# Patient Record
Sex: FEMALE | Race: WHITE | NOT HISPANIC OR LATINO | URBAN - METROPOLITAN AREA
[De-identification: names, ages, dates, MRNs, and addresses within clinical notes are randomized per-mention and may not be internally consistent; named-entity substitution may affect disease eponyms.]

---

## 2023-06-06 ENCOUNTER — OFFICE VISIT (OUTPATIENT)
Dept: PRIMARY CARE | Facility: CLINIC | Age: 23
End: 2023-06-06
Payer: COMMERCIAL

## 2023-06-06 VITALS
TEMPERATURE: 97.1 F | SYSTOLIC BLOOD PRESSURE: 102 MMHG | HEART RATE: 84 BPM | RESPIRATION RATE: 18 BRPM | HEIGHT: 62 IN | OXYGEN SATURATION: 97 % | WEIGHT: 147 LBS | DIASTOLIC BLOOD PRESSURE: 70 MMHG | BODY MASS INDEX: 27.05 KG/M2

## 2023-06-06 DIAGNOSIS — F32.A ANXIETY AND DEPRESSION: ICD-10-CM

## 2023-06-06 DIAGNOSIS — Z00.00 HEALTH MAINTENANCE EXAMINATION: Primary | ICD-10-CM

## 2023-06-06 DIAGNOSIS — F41.9 ANXIETY AND DEPRESSION: ICD-10-CM

## 2023-06-06 DIAGNOSIS — R53.83 OTHER FATIGUE: ICD-10-CM

## 2023-06-06 DIAGNOSIS — M62.89 PELVIC FLOOR DYSFUNCTION: ICD-10-CM

## 2023-06-06 DIAGNOSIS — R10.9 ABDOMINAL PAIN, UNSPECIFIED ABDOMINAL LOCATION: ICD-10-CM

## 2023-06-06 DIAGNOSIS — M79.7 FIBROMYALGIA: ICD-10-CM

## 2023-06-06 PROBLEM — F41.1 GAD (GENERALIZED ANXIETY DISORDER): Status: ACTIVE | Noted: 2023-06-06

## 2023-06-06 PROCEDURE — 99395 PREV VISIT EST AGE 18-39: CPT | Performed by: FAMILY MEDICINE

## 2023-06-06 PROCEDURE — 1036F TOBACCO NON-USER: CPT | Performed by: FAMILY MEDICINE

## 2023-06-06 PROCEDURE — 99214 OFFICE O/P EST MOD 30 MIN: CPT | Performed by: FAMILY MEDICINE

## 2023-06-06 RX ORDER — PROPRANOLOL HYDROCHLORIDE 10 MG/1
TABLET ORAL
COMMUNITY

## 2023-06-06 RX ORDER — BUPROPION HYDROCHLORIDE 300 MG/1
TABLET ORAL
COMMUNITY
Start: 2023-05-03

## 2023-06-06 RX ORDER — MEDROXYPROGESTERONE ACETATE 150 MG/ML
INJECTION, SUSPENSION INTRAMUSCULAR
COMMUNITY
End: 2023-08-02 | Stop reason: ALTCHOICE

## 2023-06-06 RX ORDER — PERPHENAZINE 4 MG/1
8 TABLET ORAL 2 TIMES DAILY
COMMUNITY
Start: 2022-02-17

## 2023-06-06 NOTE — PROGRESS NOTES
Reason for Visit: Annual Physical Exam    HPI:  Pt is status post top surgery at [facility], doing well.    She sees GYN at [facility], Dr. Ge, interested in going off of Depo-Provera because of weight gain, hesitant because it has been most helpful with her cycles and migraines caused by cycles.    Diagnosed with pelvic floor dysfunction by GYN.  Started PT but it is very far and hard to get to, wonders if any other options or if anything closer.    Reports significant fatigue.  Going on for about 4-5 years.  Did have hx of severe depression about 2 years ago which has improved but that was one of the main symptoms therefore struggles with these symptoms.  Sleeps all the time and never feels truly rested.  Feels like could always take a nap.    Reports epigastric abdominal pain present most of the time.  Was on PPI and it did help.  Wonders if possible to get gluten testing because eats a lot of carbs.        Active Problem List  Patient Active Problem List   Diagnosis    Anxiety and depression    STEPHANIE (generalized anxiety disorder)    Fibromyalgia    Pelvic floor dysfunction    Health maintenance examination    Other fatigue    Abdominal pain       Comprehensive Medical/Surgical/Social/Family History  Past Medical History:   Diagnosis Date    Flat foot (pes planus) (acquired), right foot     Flat feet    Presence of spectacles and contact lenses     Wears glasses     Past Surgical History:   Procedure Laterality Date    OTHER SURGICAL HISTORY  03/28/2021    Ankle surgery     Social History     Tobacco Use    Smoking status: Never    Smokeless tobacco: Never   Substance Use Topics    Alcohol use: Yes     Comment: rarely    Drug use: Yes     Types: Marijuana     No family history on file.      Allergies and Medications  Penicillins and Adhesive  Current Outpatient Medications on File Prior to Visit   Medication Sig Dispense Refill    buPROPion XL (Wellbutrin XL) 300 mg 24 hr tablet TAKE ONE TABLET BY MOUTH EVERY DAY  "IN THE MORNING for depression      perphenazine 4 mg tablet TAKE ONE TABLET BY MOUTH EVERY DAY FOR MOOD STABILITY      medroxyPROGESTERone (Depo-Provera) 150 mg/mL syringe injection syringe Inject into the shoulder, thigh, or buttocks.      propranolol (Inderal) 10 mg tablet TAKE ONE TABLET BY MOUTH TWO TIMES A DAY as needed for anxiety       No current facility-administered medications on file prior to visit.         ROS otherwise negative aside from what was mentioned above in HPI.    Vitals  /70 (BP Location: Right arm, Patient Position: Sitting)   Pulse 84   Temp 36.2 °C (97.1 °F) (Temporal)   Resp 18   Ht 1.575 m (5' 2.01\")   Wt 66.7 kg (147 lb)   SpO2 97%   BMI 26.88 kg/m²   Body mass index is 26.88 kg/m².  Physical Exam  Gen: Alert, NAD  HEENT:  PERRL, EOMI, conjunctiva and sclera normal in appearance. External auditory canals/TMs normal; Oral cavity and posterior pharynx without lesions/exudate  Neck:  Supple with FROM; No masses/nodes palpable; Thyroid nontender and without nodules; No YOJANA  Respiratory:  Lungs CTAB  Cardiovascular:  Heart RRR. No M/R/G. Peripheral pulses equal bilaterally  Abdomen:  Soft, nontender, BS present throughout; No R/G/R; No HSM or masses palpated  Extremities:  FROM all extremities; Muscle strength grossly normal with good tone  Neuro:  CN II-XII intact; Reflexes 2+/2+; Gross motor and sensory intact  Skin:  No suspicious lesions present    Assessment and Plan:  Problem List Items Addressed This Visit          Nervous    Abdominal pain    Current Assessment & Plan     GERD vs PUD vs IBS  Due to epigastric discomfort suspicion for high acidity  Recommend PRN use of PPI or H2 blocker  Will check CMP and celiac panel  Consideration for elimination diet such as FODMAPS         Relevant Orders    Celiac Panel       Musculoskeletal    Fibromyalgia    Current Assessment & Plan     Improved, stable symptoms          Pelvic floor dysfunction    Current Assessment & Plan     " Discussed importance of PT for these symptoms  Can refer to  pelvic floor to see if this would be more convenient         Relevant Orders    Referral to Physical Therapy       Other    Anxiety and depression    Current Assessment & Plan     Stable currently         Health maintenance examination - Primary    Current Assessment & Plan     Recommended screening guidelines addressed and orders placed as indicated by age and chronic conditions  Screening labs ordered, will call with results  PAP at 22 yo, sees GYN  Continue to work on healthy lifestyle including well balanced diet, regular activity, limit alcohol, no tobacco products and safe sexual practices  Follow up annually           Relevant Orders    Basic metabolic panel    CBC    Lipid Panel    Other fatigue    Current Assessment & Plan     Discussed multifactorial nature of this condition  Will obtain labs initially to evaluation  Pursue workup further if labs are normal         Relevant Orders    CBC    Iron and TIBC    TSH with reflex to Free T4 if abnormal    Vitamin B12    Vitamin D, Total         Marilu Carvalho MD

## 2023-06-07 ENCOUNTER — LAB (OUTPATIENT)
Dept: LAB | Facility: LAB | Age: 23
End: 2023-06-07
Payer: COMMERCIAL

## 2023-06-07 DIAGNOSIS — R10.9 ABDOMINAL PAIN, UNSPECIFIED ABDOMINAL LOCATION: ICD-10-CM

## 2023-06-07 DIAGNOSIS — R53.83 OTHER FATIGUE: ICD-10-CM

## 2023-06-07 DIAGNOSIS — Z00.00 HEALTH MAINTENANCE EXAMINATION: ICD-10-CM

## 2023-06-07 LAB
ANION GAP IN SER/PLAS: 14 MMOL/L (ref 10–20)
CALCIDIOL (25 OH VITAMIN D3) (NG/ML) IN SER/PLAS: 19 NG/ML
CALCIUM (MG/DL) IN SER/PLAS: 9.7 MG/DL (ref 8.6–10.6)
CARBON DIOXIDE, TOTAL (MMOL/L) IN SER/PLAS: 26 MMOL/L (ref 21–32)
CHLORIDE (MMOL/L) IN SER/PLAS: 101 MMOL/L (ref 98–107)
CHOLESTEROL (MG/DL) IN SER/PLAS: 135 MG/DL (ref 0–199)
CHOLESTEROL IN HDL (MG/DL) IN SER/PLAS: 29.9 MG/DL
CHOLESTEROL/HDL RATIO: 4.5
COBALAMIN (VITAMIN B12) (PG/ML) IN SER/PLAS: 1384 PG/ML (ref 211–911)
CREATININE (MG/DL) IN SER/PLAS: 0.99 MG/DL (ref 0.5–1.05)
DEAMIDATED GLIADIN PEPTIDE IGA: <1 U/ML (ref 0–14)
DEAMIDATED GLIADIN PEPTIDE IGG: <1 U/ML (ref 0–14)
ERYTHROCYTE DISTRIBUTION WIDTH (RATIO) BY AUTOMATED COUNT: 13.1 % (ref 11.5–14.5)
ERYTHROCYTE MEAN CORPUSCULAR HEMOGLOBIN CONCENTRATION (G/DL) BY AUTOMATED: 31.8 G/DL (ref 32–36)
ERYTHROCYTE MEAN CORPUSCULAR VOLUME (FL) BY AUTOMATED COUNT: 87 FL (ref 80–100)
ERYTHROCYTES (10*6/UL) IN BLOOD BY AUTOMATED COUNT: 4.64 X10E12/L (ref 4–5.2)
GFR FEMALE: 83 ML/MIN/1.73M2
GLUCOSE (MG/DL) IN SER/PLAS: 135 MG/DL (ref 74–99)
HEMATOCRIT (%) IN BLOOD BY AUTOMATED COUNT: 40.3 % (ref 36–46)
HEMOGLOBIN (G/DL) IN BLOOD: 12.8 G/DL (ref 12–16)
IRON (UG/DL) IN SER/PLAS: 37 UG/DL (ref 35–150)
IRON BINDING CAPACITY (UG/DL) IN SER/PLAS: 388 UG/DL (ref 240–445)
IRON SATURATION (%) IN SER/PLAS: 10 % (ref 25–45)
LDL: 77 MG/DL (ref 0–109)
LEUKOCYTES (10*3/UL) IN BLOOD BY AUTOMATED COUNT: 6.8 X10E9/L (ref 4.4–11.3)
NON HDL CHOLESTEROL: 105 MG/DL (ref 0–119)
NRBC (PER 100 WBCS) BY AUTOMATED COUNT: 0 /100 WBC (ref 0–0)
PLATELETS (10*3/UL) IN BLOOD AUTOMATED COUNT: 368 X10E9/L (ref 150–450)
POTASSIUM (MMOL/L) IN SER/PLAS: 4 MMOL/L (ref 3.5–5.3)
SODIUM (MMOL/L) IN SER/PLAS: 137 MMOL/L (ref 136–145)
THYROTROPIN (MIU/L) IN SER/PLAS BY DETECTION LIMIT <= 0.05 MIU/L: 1.39 MIU/L (ref 0.44–3.98)
TISSUE TRANSGLUTAMINASE IGG: <1 U/ML (ref 0–14)
TISSUE TRANSGLUTAMINASE, IGA: <1 U/ML (ref 0–14)
TRIGLYCERIDE (MG/DL) IN SER/PLAS: 139 MG/DL (ref 0–149)
UREA NITROGEN (MG/DL) IN SER/PLAS: 11 MG/DL (ref 6–23)
VLDL: 28 MG/DL (ref 0–40)

## 2023-06-07 PROCEDURE — 85027 COMPLETE CBC AUTOMATED: CPT

## 2023-06-07 PROCEDURE — 80061 LIPID PANEL: CPT

## 2023-06-07 PROCEDURE — 80048 BASIC METABOLIC PNL TOTAL CA: CPT

## 2023-06-07 PROCEDURE — 82607 VITAMIN B-12: CPT

## 2023-06-07 PROCEDURE — 36415 COLL VENOUS BLD VENIPUNCTURE: CPT

## 2023-06-07 PROCEDURE — 83550 IRON BINDING TEST: CPT

## 2023-06-07 PROCEDURE — 83516 IMMUNOASSAY NONANTIBODY: CPT

## 2023-06-07 PROCEDURE — 84443 ASSAY THYROID STIM HORMONE: CPT

## 2023-06-07 PROCEDURE — 82306 VITAMIN D 25 HYDROXY: CPT

## 2023-06-07 PROCEDURE — 83540 ASSAY OF IRON: CPT

## 2023-06-07 NOTE — ASSESSMENT & PLAN NOTE
Recommended screening guidelines addressed and orders placed as indicated by age and chronic conditions  Screening labs ordered, will call with results  PAP at 24 yo, sees GYN  Continue to work on healthy lifestyle including well balanced diet, regular activity, limit alcohol, no tobacco products and safe sexual practices  Follow up annually

## 2023-06-07 NOTE — ASSESSMENT & PLAN NOTE
Discussed importance of PT for these symptoms  Can refer to  pelvic floor to see if this would be more convenient

## 2023-06-07 NOTE — ASSESSMENT & PLAN NOTE
Discussed multifactorial nature of this condition  Will obtain labs initially to evaluation  Pursue workup further if labs are normal   Solaraze Pregnancy And Lactation Text: This medication is Pregnancy Category B and is considered safe. There is some data to suggest avoiding during the third trimester. It is unknown if this medication is excreted in breast milk.

## 2023-06-08 DIAGNOSIS — E55.9 VITAMIN D DEFICIENCY: Primary | ICD-10-CM

## 2023-06-08 RX ORDER — ERGOCALCIFEROL 1.25 MG/1
50000 CAPSULE ORAL
Qty: 8 CAPSULE | Refills: 0 | Status: SHIPPED | OUTPATIENT
Start: 2023-06-08 | End: 2023-08-03

## 2023-07-28 ENCOUNTER — TELEPHONE (OUTPATIENT)
Dept: PRIMARY CARE | Facility: CLINIC | Age: 23
End: 2023-07-28
Payer: COMMERCIAL

## 2023-07-28 ENCOUNTER — PATIENT MESSAGE (OUTPATIENT)
Dept: PRIMARY CARE | Facility: CLINIC | Age: 23
End: 2023-07-28
Payer: COMMERCIAL

## 2023-07-28 DIAGNOSIS — M79.7 FIBROMYALGIA: Primary | ICD-10-CM

## 2023-07-28 RX ORDER — CYCLOBENZAPRINE HCL 5 MG
5 TABLET ORAL NIGHTLY PRN
Qty: 15 TABLET | Refills: 0 | Status: SHIPPED | OUTPATIENT
Start: 2023-07-28 | End: 2023-08-02 | Stop reason: SINTOL

## 2023-08-02 ENCOUNTER — OFFICE VISIT (OUTPATIENT)
Dept: PRIMARY CARE | Facility: CLINIC | Age: 23
End: 2023-08-02
Payer: COMMERCIAL

## 2023-08-02 VITALS
WEIGHT: 146.6 LBS | BODY MASS INDEX: 26.98 KG/M2 | HEART RATE: 76 BPM | SYSTOLIC BLOOD PRESSURE: 102 MMHG | TEMPERATURE: 98.4 F | OXYGEN SATURATION: 98 % | DIASTOLIC BLOOD PRESSURE: 68 MMHG | HEIGHT: 62 IN

## 2023-08-02 DIAGNOSIS — M79.7 FIBROMYALGIA: Primary | ICD-10-CM

## 2023-08-02 PROCEDURE — 99213 OFFICE O/P EST LOW 20 MIN: CPT | Performed by: FAMILY MEDICINE

## 2023-08-02 PROCEDURE — 1036F TOBACCO NON-USER: CPT | Performed by: FAMILY MEDICINE

## 2023-08-02 RX ORDER — LANOLIN ALCOHOL/MO/W.PET/CERES
100 CREAM (GRAM) TOPICAL DAILY
COMMUNITY

## 2023-08-02 NOTE — ASSESSMENT & PLAN NOTE
Extensive discussion regarding fibromyalgia and available treatments  Discussed expectations, limitations of currently available treatments  Pt is establishing with new psychologist, recommend considering CBT if available  Remain active as long as not triggering symptoms   Try to limit sleep to regular nighttime sleep   Stay well hydrated  Discussed medical marijuana due to pt's previous success with cannabis, information provided  Also, referral to rheumatology provided to discuss second opinion, treatment options

## 2023-08-02 NOTE — PROGRESS NOTES
"Subjective   Patient ID: Glenn Cox is a 22 y.o. adult who presents for Fibromyalgia (HAVING A FLARE, PAIN ALL OVER HER BODY,  STARTED ON THURS).    HPI   Has history of fibromyalgia diagnosed by rheumatology in 2022.  Reports recurrent flares.  Can last several weeks or several days. Current episode started about one week ago, some improvement but limits her ability to function.  Experiencing significant fatigue, joint pains. Trying to stay active but it makes her even more fatigued.    Very hesitant about medication because feels like she takes too much at this time.  Would like to avoid.    Review of Systems  Negative unless noted in HPI    Objective   /68 (BP Location: Left arm, Patient Position: Sitting)   Pulse 76   Temp 36.9 °C (98.4 °F) (Oral) Comment: USUALLY 97  Ht 1.575 m (5' 2.01\")   Wt 66.5 kg (146 lb 9.6 oz)   SpO2 98%   BMI 26.81 kg/m²     Physical Exam  Constitutional:       Appearance: Normal appearance.   Pulmonary:      Effort: Pulmonary effort is normal.   Neurological:      General: No focal deficit present.      Mental Status: Glenn Cox is alert.   Psychiatric:         Mood and Affect: Mood normal.         Assessment/Plan   Problem List Items Addressed This Visit       Fibromyalgia - Primary     Extensive discussion regarding fibromyalgia and available treatments  Discussed expectations, limitations of currently available treatments  Pt is establishing with new psychologist, recommend considering CBT if available  Remain active as long as not triggering symptoms   Try to limit sleep to regular nighttime sleep   Stay well hydrated  Discussed medical marijuana due to pt's previous success with cannabis, information provided  Also, referral to rheumatology provided to discuss second opinion, treatment options         Relevant Orders    Referral to Rheumatology          "

## 2023-08-16 ENCOUNTER — OFFICE VISIT (OUTPATIENT)
Dept: PRIMARY CARE | Facility: CLINIC | Age: 23
End: 2023-08-16
Payer: COMMERCIAL

## 2023-08-16 VITALS
HEART RATE: 92 BPM | BODY MASS INDEX: 27.23 KG/M2 | DIASTOLIC BLOOD PRESSURE: 74 MMHG | HEIGHT: 62 IN | TEMPERATURE: 98.6 F | WEIGHT: 148 LBS | SYSTOLIC BLOOD PRESSURE: 122 MMHG | OXYGEN SATURATION: 98 %

## 2023-08-16 DIAGNOSIS — Z20.818 STREP THROAT EXPOSURE: ICD-10-CM

## 2023-08-16 DIAGNOSIS — J02.9 PHARYNGITIS, UNSPECIFIED ETIOLOGY: Primary | ICD-10-CM

## 2023-08-16 DIAGNOSIS — N39.0 URINARY TRACT INFECTION WITHOUT HEMATURIA, SITE UNSPECIFIED: ICD-10-CM

## 2023-08-16 LAB
POC APPEARANCE, URINE: CLEAR
POC BILIRUBIN, URINE: NEGATIVE
POC BLOOD, URINE: ABNORMAL
POC COLOR, URINE: YELLOW
POC GLUCOSE, URINE: NEGATIVE MG/DL
POC KETONES, URINE: NEGATIVE MG/DL
POC LEUKOCYTES, URINE: ABNORMAL
POC NITRITE,URINE: NEGATIVE
POC PH, URINE: 7 PH
POC PROTEIN, URINE: NEGATIVE MG/DL
POC RAPID STREP: NEGATIVE
POC SPECIFIC GRAVITY, URINE: <=1.005
POC UROBILINOGEN, URINE: 0.2 EU/DL

## 2023-08-16 PROCEDURE — 1036F TOBACCO NON-USER: CPT | Performed by: NURSE PRACTITIONER

## 2023-08-16 PROCEDURE — 87086 URINE CULTURE/COLONY COUNT: CPT

## 2023-08-16 PROCEDURE — 99213 OFFICE O/P EST LOW 20 MIN: CPT | Performed by: NURSE PRACTITIONER

## 2023-08-16 PROCEDURE — 87081 CULTURE SCREEN ONLY: CPT

## 2023-08-16 PROCEDURE — 87880 STREP A ASSAY W/OPTIC: CPT | Performed by: NURSE PRACTITIONER

## 2023-08-16 PROCEDURE — 81003 URINALYSIS AUTO W/O SCOPE: CPT | Performed by: NURSE PRACTITIONER

## 2023-08-16 RX ORDER — AZITHROMYCIN 250 MG/1
TABLET, FILM COATED ORAL
Qty: 6 TABLET | Refills: 0 | Status: SHIPPED | OUTPATIENT
Start: 2023-08-16 | End: 2023-08-21

## 2023-08-16 RX ORDER — ONDANSETRON 4 MG/1
4 TABLET, FILM COATED ORAL EVERY 8 HOURS PRN
Qty: 20 TABLET | Refills: 0 | Status: SHIPPED | OUTPATIENT
Start: 2023-08-16 | End: 2023-08-23

## 2023-08-16 ASSESSMENT — PAIN SCALES - GENERAL: PAINLEVEL: 4

## 2023-08-16 ASSESSMENT — ENCOUNTER SYMPTOMS: SORE THROAT: 1

## 2023-08-16 ASSESSMENT — PATIENT HEALTH QUESTIONNAIRE - PHQ9
2. FEELING DOWN, DEPRESSED OR HOPELESS: NOT AT ALL
SUM OF ALL RESPONSES TO PHQ9 QUESTIONS 1 AND 2: 0
1. LITTLE INTEREST OR PLEASURE IN DOING THINGS: NOT AT ALL

## 2023-08-16 NOTE — PATIENT INSTRUCTIONS
Rapid strep negative in office. Wait for results of strep testing.  Treat today for tonsillitis.  Zofran for nausea as needed while on antibiotic.  Follow up if symptoms are not improving with treatment.

## 2023-08-16 NOTE — PROGRESS NOTES
"Subjective   Patient ID: Glenn Cox is a 22 y.o. adult who presents for Sore Throat and UTI.    Presents for 3-4 days of fatigue and sore throat.   Denies any congestion, fever. +dysphagia  Advil helpls a little bit.   Neck and ear also painful on the right side.  No known exposures.  Also with some new dysuria x 2 days.      Sore Throat     UTI          Review of Systems   HENT:  Positive for sore throat.        Objective   /74 (BP Location: Left arm, Patient Position: Sitting, BP Cuff Size: Small adult)   Pulse 92   Temp 37 °C (98.6 °F) (Oral)   Ht 1.575 m (5' 2\")   Wt 67.1 kg (148 lb)   LMP  (LMP Unknown)   SpO2 98%   BMI 27.07 kg/m²     Physical Exam  Vitals and nursing note reviewed.   Constitutional:       General: Glenn Cox is not in acute distress.     Appearance: Normal appearance.   HENT:      Right Ear: Tympanic membrane, ear canal and external ear normal.      Left Ear: Tympanic membrane, ear canal and external ear normal.      Nose: Nose normal.      Mouth/Throat:      Mouth: Mucous membranes are moist.      Tongue: No lesions.      Pharynx: Pharyngeal swelling (right) and posterior oropharyngeal erythema present. No oropharyngeal exudate.      Tonsils: 3+ on the right. 1+ on the left.   Cardiovascular:      Rate and Rhythm: Normal rate and regular rhythm.      Pulses: Normal pulses.      Heart sounds: Normal heart sounds. No murmur heard.  Lymphadenopathy:      Cervical: Cervical adenopathy present.      Right cervical: Superficial cervical adenopathy present.      Left cervical: No superficial cervical adenopathy.         Assessment/Plan   Diagnoses and all orders for this visit:  Pharyngitis, unspecified etiology  -     azithromycin (Zithromax) 250 mg tablet; Take 2 tablets (500 mg) by mouth once daily for 1 day, THEN 1 tablet (250 mg) once daily for 4 days.  -     ondansetron (Zofran) 4 mg tablet; Take 1 tablet (4 mg) by mouth every 8 hours if needed for nausea or " vomiting for up to 7 days.  Strep throat exposure  -     POCT Rapid Strep A manually resulted  -     Group A Streptococcus, Culture  Urinary tract infection without hematuria, site unspecified  -     POCT UA Automated manually resulted  -     Urine Culture

## 2023-08-17 LAB — URINE CULTURE: ABNORMAL

## 2023-08-18 LAB — GROUP A STREP SCREEN, CULTURE: NORMAL

## 2023-09-20 ENCOUNTER — OFFICE VISIT (OUTPATIENT)
Dept: PRIMARY CARE | Facility: CLINIC | Age: 23
End: 2023-09-20
Payer: COMMERCIAL

## 2023-09-20 VITALS
HEIGHT: 62 IN | DIASTOLIC BLOOD PRESSURE: 76 MMHG | SYSTOLIC BLOOD PRESSURE: 110 MMHG | HEART RATE: 85 BPM | BODY MASS INDEX: 27.12 KG/M2 | WEIGHT: 147.4 LBS | OXYGEN SATURATION: 98 % | TEMPERATURE: 97.6 F

## 2023-09-20 DIAGNOSIS — Z30.019 ENCOUNTER FOR FEMALE BIRTH CONTROL: ICD-10-CM

## 2023-09-20 DIAGNOSIS — N91.2 AMENORRHEA: ICD-10-CM

## 2023-09-20 DIAGNOSIS — L42 PITYRIASIS ROSEA: Primary | ICD-10-CM

## 2023-09-20 PROBLEM — R10.9 ABDOMINAL PAIN: Status: RESOLVED | Noted: 2023-06-06 | Resolved: 2023-09-20

## 2023-09-20 PROBLEM — Z00.00 HEALTH MAINTENANCE EXAMINATION: Status: RESOLVED | Noted: 2023-06-06 | Resolved: 2023-09-20

## 2023-09-20 LAB — PREGNANCY TEST URINE, POC: NEGATIVE

## 2023-09-20 PROCEDURE — 99214 OFFICE O/P EST MOD 30 MIN: CPT | Performed by: FAMILY MEDICINE

## 2023-09-20 PROCEDURE — 81025 URINE PREGNANCY TEST: CPT | Performed by: FAMILY MEDICINE

## 2023-09-20 PROCEDURE — 1036F TOBACCO NON-USER: CPT | Performed by: FAMILY MEDICINE

## 2023-09-20 RX ORDER — ETONOGESTREL AND ETHINYL ESTRADIOL VAGINAL RING .015; .12 MG/D; MG/D
1 RING VAGINAL
Qty: 3 EACH | Refills: 3 | Status: SHIPPED | OUTPATIENT
Start: 2023-09-20 | End: 2024-09-19

## 2023-09-20 ASSESSMENT — PAIN SCALES - GENERAL: PAINLEVEL: 4

## 2023-09-20 ASSESSMENT — PATIENT HEALTH QUESTIONNAIRE - PHQ9
2. FEELING DOWN, DEPRESSED OR HOPELESS: NOT AT ALL
1. LITTLE INTEREST OR PLEASURE IN DOING THINGS: NOT AT ALL
SUM OF ALL RESPONSES TO PHQ9 QUESTIONS 1 AND 2: 0

## 2023-09-20 NOTE — PROGRESS NOTES
"Subjective   Patient ID: Glenn Cox is a 22 y.o. adult who presents for Rash (Red spots left side torso some itching ) and Contraception (To discuss birth control ).    HPI   Noticed rash on torso for about a week.  Started as a large spot and then got smaller ones.  States that it was mildly itchy but not much.  No recent illnesses, no med changes.  Is going through some stress with school, applying to grad school.    Interested in OCP.  Stopped Depo secondary to weight gain, last shot was in May 2023.  Interested in Nuvaring.  Was on OCP and made her nauseous, did well with patch but hard to keep on.      Review of Systems  Negative unless noted in HPI    Objective   /76 (BP Location: Right arm, Patient Position: Sitting, BP Cuff Size: Adult)   Pulse 85   Temp 36.4 °C (97.6 °F) (Temporal)   Ht 1.575 m (5' 2\")   Wt 66.9 kg (147 lb 6.4 oz)   SpO2 98%   BMI 26.96 kg/m²     Physical Exam  Gen: A&Ox3, NAD  Skin: left torso with 2cm pink lesion with minimally scaly appearance, diffuse distribution of similar but smaller lesions on the torso    Assessment/Plan   Problem List Items Addressed This Visit       Amenorrhea     Negative pregnancy test         Relevant Orders    POCT pregnancy, urine manually resulted (Completed)    Pityriasis rosea - Primary     Discussed diagnosis  Self limited nature  Can use steroid cream for itching  Pt handout provided         Encounter for female birth control     Discussed different options  Start Nuvaring  No contraindications to therapy         Relevant Medications    etonogestreL-ethinyl estradioL (Nuvaring) 0.12-0.015 mg/24 hr vaginal ring          "

## 2023-10-02 ENCOUNTER — APPOINTMENT (OUTPATIENT)
Dept: PHYSICAL THERAPY | Facility: CLINIC | Age: 23
End: 2023-10-02
Payer: COMMERCIAL

## 2023-10-17 PROBLEM — G89.29 CHRONIC BILATERAL THORACIC BACK PAIN: Status: ACTIVE | Noted: 2023-10-17

## 2023-10-17 PROBLEM — Q66.6 PES VALGUS: Status: ACTIVE | Noted: 2017-10-04

## 2023-10-17 PROBLEM — M54.10 BACK PAIN WITH RADICULOPATHY: Status: ACTIVE | Noted: 2023-10-17

## 2023-10-17 PROBLEM — H52.203 ASTIGMATISM, BILATERAL: Status: ACTIVE | Noted: 2023-10-17

## 2023-10-17 PROBLEM — N62 GYNECOMASTIA, FEMALE: Status: ACTIVE | Noted: 2023-10-17

## 2023-10-17 PROBLEM — M54.6 CHRONIC BILATERAL THORACIC BACK PAIN: Status: ACTIVE | Noted: 2023-10-17

## 2023-10-17 PROBLEM — B36.0 TINEA VERSICOLOR: Status: ACTIVE | Noted: 2023-10-17

## 2023-10-17 PROBLEM — G57.91 PERIPHERAL NEURITIS OF RIGHT FOOT: Status: ACTIVE | Noted: 2023-10-17

## 2023-10-17 PROBLEM — M25.571 SINUS TARSI SYNDROME OF RIGHT ANKLE: Status: ACTIVE | Noted: 2023-10-17

## 2023-10-17 PROBLEM — M79.10 MYALGIA: Status: ACTIVE | Noted: 2023-10-17

## 2023-10-17 PROBLEM — F64.9 GENDER DYSPHORIA: Status: ACTIVE | Noted: 2022-11-08

## 2023-10-17 PROBLEM — N93.9 ABNORMAL UTERINE BLEEDING (AUB): Status: ACTIVE | Noted: 2023-10-17

## 2023-10-17 PROBLEM — M76.821 POSTERIOR TIBIAL TENDINITIS OF RIGHT LEG: Status: ACTIVE | Noted: 2023-10-17

## 2023-10-17 PROBLEM — N94.6 DYSMENORRHEA IN ADOLESCENT: Status: ACTIVE | Noted: 2023-10-17

## 2023-10-17 RX ORDER — OMEPRAZOLE 40 MG/1
40 CAPSULE, DELAYED RELEASE ORAL
COMMUNITY
Start: 2023-03-01 | End: 2024-04-03

## 2023-10-17 RX ORDER — DOCUSATE SODIUM 100 MG/1
100 CAPSULE, LIQUID FILLED ORAL 2 TIMES DAILY
COMMUNITY
Start: 2023-01-20

## 2023-10-17 RX ORDER — MEDROXYPROGESTERONE ACETATE 150 MG/ML
INJECTION, SUSPENSION INTRAMUSCULAR
COMMUNITY
Start: 2023-05-05 | End: 2024-04-03

## 2023-10-17 RX ORDER — BUPROPION HYDROCHLORIDE 100 MG/1
300 TABLET ORAL
COMMUNITY
End: 2024-04-03

## 2023-10-17 RX ORDER — OXYCODONE HYDROCHLORIDE 5 MG/1
TABLET ORAL
COMMUNITY
Start: 2023-01-20 | End: 2024-04-03

## 2023-10-17 RX ORDER — GABAPENTIN 300 MG/1
300 CAPSULE ORAL 3 TIMES DAILY
COMMUNITY
End: 2024-04-03

## 2023-10-17 RX ORDER — KETOCONAZOLE 20 MG/G
CREAM TOPICAL DAILY
COMMUNITY
Start: 2022-05-16 | End: 2024-04-03

## 2023-10-17 RX ORDER — GABAPENTIN 400 MG/1
1 CAPSULE ORAL 3 TIMES DAILY
COMMUNITY
End: 2024-04-03

## 2023-10-17 RX ORDER — ACETAMINOPHEN 500 MG
1000 TABLET ORAL EVERY 6 HOURS PRN
COMMUNITY
Start: 2023-01-20 | End: 2024-04-03

## 2023-10-17 RX ORDER — DULOXETIN HYDROCHLORIDE 30 MG/1
30 CAPSULE, DELAYED RELEASE ORAL DAILY
COMMUNITY
End: 2024-04-03

## 2023-10-17 RX ORDER — ESCITALOPRAM OXALATE 20 MG/1
20 TABLET ORAL DAILY
COMMUNITY
End: 2024-04-03

## 2023-10-17 RX ORDER — ONDANSETRON 4 MG/1
4 TABLET, ORALLY DISINTEGRATING ORAL EVERY 6 HOURS PRN
COMMUNITY
Start: 2023-03-01

## 2023-10-17 RX ORDER — FAMOTIDINE 10 MG/1
10 TABLET ORAL NIGHTLY
COMMUNITY
Start: 2023-03-01

## 2023-10-17 RX ORDER — BUPROPION HYDROCHLORIDE 75 MG/1
75 TABLET ORAL EVERY MORNING
COMMUNITY
Start: 2022-04-28 | End: 2024-04-03

## 2023-10-17 RX ORDER — NORGESTIMATE AND ETHINYL ESTRADIOL 0.25-0.035
1 KIT ORAL DAILY
COMMUNITY
Start: 2020-02-25 | End: 2024-04-03

## 2023-10-18 ENCOUNTER — DOCUMENTATION (OUTPATIENT)
Dept: BEHAVIORAL HEALTH | Facility: HOSPITAL | Age: 23
End: 2023-10-18
Payer: COMMERCIAL

## 2023-10-18 ENCOUNTER — APPOINTMENT (OUTPATIENT)
Dept: PHYSICAL THERAPY | Facility: CLINIC | Age: 23
End: 2023-10-18
Payer: COMMERCIAL

## 2023-10-19 ENCOUNTER — DOCUMENTATION (OUTPATIENT)
Dept: PHYSICAL THERAPY | Facility: CLINIC | Age: 23
End: 2023-10-19
Payer: COMMERCIAL

## 2023-10-19 NOTE — PROGRESS NOTES
Discharge Summary    Name: Glenn Cox  MRN: [id number]  : 2000  Date: 10/19/23    Discharge Summary: PT    Discharge Information: Date of discharge 10/19/23, Date of last visit 23, Date of evaluation 23, Number of attended visits 2, Referred by Dr. Marilu Carvalho, and Referred for PFD    Therapy Summary: Pt presenting to PT IE with c/o dyspareunia, urinary frequency, and difficulty emptying bladder fully. Pt demonstrating s/s c/w PF and abdominal mm tightness/restriction, postural impairments, core and PF strength limitations, thoracic hypomobility, and LE flexibility deficits. Skilled PT services initiated to address above impairments, assist in symptom management, and maximize QOL.    Discharge Status: No formal reassessment performed as pt canceling last 2 scheduled visits. During follow up session, pt reporting improvement with POC.     Rehab Discharge Reason: Other Pt canceling last 2 scheduled visits. This PT speaking with patient via My Chart. Pt informing PT they're is doing well, would like to discharge case.

## 2023-10-24 ENCOUNTER — DOCUMENTATION (OUTPATIENT)
Dept: BEHAVIORAL HEALTH | Facility: CLINIC | Age: 23
End: 2023-10-24
Payer: COMMERCIAL

## 2023-10-24 NOTE — PROGRESS NOTES
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      Content of therapy:   Demetra described intrusive thoughts that she's had since childhood, but she was reluctant to discuss them in any detail, afraid that focusing her attention on these thoughts might make them worse.         Mental Status  Mood: anxious   Affect:  anxious   Behavior:   Guarded.   Suicidality: None, denied     Therapeutic Interventions:  -Guided patient through problem-solving strategies    -Supported patient in enhancing self-compassion    -Explored with patient childhood attachment and parenting styles    Diagnosis:  Bipolar, Depressed      Plan:  This plan was developed collaboratively with Glenn Cox. Discussed treatment progress and plans to continue therapy. Glenn Cox agreed to continue with treatment plan as noted below.      -Continue weekly therapy and psychotropic medications as prescribed by psychiatrist   - Assist in understanding emotional, cognitive, and behavioral components of anxiety/depression/anger; develop skills to manage these symptoms  - Will learn to identify anxiety producing thoughts and self statements and develop and use appropriate counter statements  - Assist in identifying and verbalizing feelings to clarify them and increase interpersonal awareness as to causes; use insight gained to assist in resolving negative emotions in a more effective manner  - Develop a plan with patient to include activities that will increase their satisfaction, fulfill their values, and improve quality of life. Evaluate effectiveness of plan; Introduce goal setting and problem solving skills  - Assist in increasing awareness of link between emotions, thoughts and behaviors; identify irrational beliefs and cognitive distortions; learn to " challenge irrational beliefs and cognitive distortions and replace them with more realistic self statements (CBT)  - Assist in developing a crisis management plan; build crisis management skills; continue to assess for risk of suicide &/or self harm; continued reinforcement of use of plan; review and revise plan as needed  - Continue building distress tolerance skills for more effective emotional regulation; Assist in recognizing difference between feelings and thoughts; Assist in learning to act on thoughts rather than impulsive feelings; Assist them in moving from an external locus of control to an internal locus of control and building their sense of self. (DBT)  - Assist in learning to use problem solving skills (i.e. identify the problem, brainstorm possible options, evaluate each option, implement course of action, and evaluate results)  - Assist in developing an understanding of healthy life- style skills (i.e. nutrition, exercise, sleep, decreasing caffeine, etc.). Develop plan to increase healthy activities. Provide positive reinforcement for taking action steps toward a healthier life-style.  - Assist in developing a crisis management plan (i.e. warning signs, coping skills, contact people, use of crisis center, etc.). Review use and effectiveness of plan in session. Assess for risk of harm (ideation, presence of a plan, past attempts, family history, current mood, etc)    Will discuss case with Dr. Boggs

## 2023-10-24 NOTE — PROGRESS NOTES
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     Content of therapy:   Glenn talked more about the anxiety they experienced as a kid, and how their mom believes that not talking about a problem might help the problem to go away. They also talked about their recent trip to [place], where they celebrated their birthday with their sister and one of their best friends.     Mental Status  Mood: euthymic   Affect:  calm and pleasant   Behavior:   Staring eye contact.   Suicidality: None, denied     Therapeutic Interventions:  -Guided patient through identifying and expressing emotions    -Supported patient in enhancing self-compassion    -Explored with patient coping with chronic illness/pain    Diagnosis:  Bipolar, Depressed    While Glenn's psychiatric illness and chronic pain chronically elevate her risk of harm to herself somewhat, she is currently at low acute risk of harm to herself and others. Factors decreasing her risk of harm to self/others include: no prior psychiatric hospitalization, no suicidal/violent ideation on interview, no history of legal trouble. Protective factors include: help-seeking, medication adherence, supportive mother and friends, and future-orientation.     Plan  -Continue weekly therapy     Plan:  -Continue weekly therapy   -Continue psychotropic medications as prescribed by psychiatrist     This plan was developed collaboratively with Glenn Cox. Discussed treatment progress and plans to continue therapy. Glenn Cox agreed to continue with treatment plan as noted below.      - Assist in understanding emotional, cognitive, and behavioral components of anxiety/depression/anger; develop skills to manage these symptoms  - Will learn to identify anxiety producing thoughts and " self statements and develop and use appropriate counter statements  - Assist in identifying and verbalizing feelings to clarify them and increase interpersonal awareness as to causes; use insight gained to assist in resolving negative emotions in a more effective manner  - Develop a plan with patient to include activities that will increase their satisfaction, fulfill their values, and improve quality of life. Evaluate effectiveness of plan; Introduce goal setting and problem solving skills  - Assist in increasing awareness of link between emotions, thoughts and behaviors; identify irrational beliefs and cognitive distortions; learn to challenge irrational beliefs and cognitive distortions and replace them with more realistic self statements (CBT)  - Assist in developing a crisis management plan; build crisis management skills; continue to assess for risk of suicide &/or self harm; continued reinforcement of use of plan; review and revise plan as needed  - Continue building distress tolerance skills for more effective emotional regulation; Assist in recognizing difference between feelings and thoughts; Assist in learning to act on thoughts rather than impulsive feelings; Assist them in moving from an external locus of control to an internal locus of control and building their sense of self. (DBT)  - Assist in learning to use problem solving skills (i.e. identify the problem, brainstorm possible options, evaluate each option, implement course of action, and evaluate results)  - Assist in developing an understanding of healthy life- style skills (i.e. nutrition, exercise, sleep, decreasing caffeine, etc.). Develop plan to increase healthy activities. Provide positive reinforcement for taking action steps toward a healthier life-style.  - Assist in developing a crisis management plan (i.e. warning signs, coping skills, contact people, use of crisis center, etc.). Review use and effectiveness of plan in session. Assess  for risk of harm (ideation, presence of a plan, past attempts, family history, current mood, etc)    Case discussed with Dr. Boggs

## 2023-11-02 ENCOUNTER — DOCUMENTATION (OUTPATIENT)
Dept: BEHAVIORAL HEALTH | Facility: HOSPITAL | Age: 23
End: 2023-11-02
Payer: COMMERCIAL

## 2023-11-02 NOTE — PROGRESS NOTES
"Start Time: 15:00        End Time: 16:00                      Date of 11/2/23  Service:  psychiatry     Glenn is a 22-year-old student (\"they/them\") with a PPHx of bipolar disorder and STEPHANIE and PMH or fibromyalgia/chronic pain, presenting for weekly therapy.     Content of therapy:   Glenn talked about anxiety around schoolwork, specifically one assignment she's late to turn in for social psychology. She also talked about pressure she feels to be different, including pressure to exercise and eat more healthily. I apologized to her for having recommended exercise strongly during our last session, when that wasn't the direction she wanted us to take.     She had to end our session early, so this was a short session.      Mental Status  Mood: euthymic   Affect:  calm and pleasant   Behavior:   Staring eye contact.   Suicidality: None, denied      Therapeutic Interventions:  -Guided patient through identifying and expressing emotions     -Supported patient in enhancing self-compassion     -Explored with patient coping with chronic illness/pain     Diagnosis:  Bipolar, Depressed     While Glenn's psychiatric illness and chronic pain chronically elevate her risk of harm to herself somewhat, she is currently at low acute risk of harm to herself and others. Factors decreasing her risk of harm to self/others include: no prior psychiatric hospitalization, no suicidal/violent ideation on interview, no history of legal trouble. Protective factors include: help-seeking, medication adherence, supportive mother and friends, and future-orientation.     Plan:  -Continue weekly therapy   -Continue psychotropic medications as prescribed by psychiatrist      This plan was developed collaboratively with Glenn Cox. Discussed treatment progress and plans to continue therapy. Glenn Cox agreed to continue with treatment plan as noted below.       - Assist in understanding emotional, cognitive, and behavioral " components of anxiety/depression/anger; develop skills to manage these symptoms  - Will learn to identify anxiety producing thoughts and self statements and develop and use appropriate counter statements  - Assist in identifying and verbalizing feelings to clarify them and increase interpersonal awareness as to causes; use insight gained to assist in resolving negative emotions in a more effective manner  - Develop a plan with patient to include activities that will increase their satisfaction, fulfill their values, and improve quality of life. Evaluate effectiveness of plan; Introduce goal setting and problem solving skills  - Assist in increasing awareness of link between emotions, thoughts and behaviors; identify irrational beliefs and cognitive distortions; learn to challenge irrational beliefs and cognitive distortions and replace them with more realistic self statements (CBT)  - Assist in developing a crisis management plan; build crisis management skills; continue to assess for risk of suicide &/or self harm; continued reinforcement of use of plan; review and revise plan as needed  - Continue building distress tolerance skills for more effective emotional regulation; Assist in recognizing difference between feelings and thoughts; Assist in learning to act on thoughts rather than impulsive feelings; Assist them in moving from an external locus of control to an internal locus of control and building their sense of self. (DBT)  - Assist in learning to use problem solving skills (i.e. identify the problem, brainstorm possible options, evaluate each option, implement course of action, and evaluate results)  - Assist in developing an understanding of healthy life- style skills (i.e. nutrition, exercise, sleep, decreasing caffeine, etc.). Develop plan to increase healthy activities. Provide positive reinforcement for taking action steps toward a healthier life-style.  - Assist in developing a crisis management plan  (i.e. warning signs, coping skills, contact people, use of crisis center, etc.). Review use and effectiveness of plan in session. Assess for risk of harm (ideation, presence of a plan, past attempts, family history, current mood, etc)     Will discuss this pt with Dr. Boggs

## 2023-11-02 NOTE — PROGRESS NOTES
"Start Time: 15:00        End Time: 16:00                      Date of 10/26/23  Service:  psychiatry     Glenn is a 22-year-old student (\"they/them\") with a PPHx of bipolar disorder and STEPHANIE and PMH or fibromyalgia/chronic pain, presenting for her third appointment.      Content of therapy:   Glenn described how they might approach future romantic relationships differently in the future. We revisited their goals for therapy: recognizing when they're about to slip into another depression and staying reflective about therapy. We also discussed exercise as one strategy that could help with depression.      Mental Status  Mood: euthymic   Affect:  calm and pleasant   Behavior:   Staring eye contact.   Suicidality: None, denied      Therapeutic Interventions:  -Guided patient through identifying and expressing emotions     -Supported patient in enhancing self-compassion     -Explored with patient coping with chronic illness/pain     Diagnosis:  Bipolar, Depressed     While Saeeds psychiatric illness and chronic pain chronically elevate her risk of harm to herself somewhat, she is currently at low acute risk of harm to herself and others. Factors decreasing her risk of harm to self/others include: no prior psychiatric hospitalization, no suicidal/violent ideation on interview, no history of legal trouble. Protective factors include: help-seeking, medication adherence, supportive mother and friends, and future-orientation.     Plan:  -Continue weekly therapy   -Continue psychotropic medications as prescribed by psychiatrist      This plan was developed collaboratively with Glenn Cox. Discussed treatment progress and plans to continue therapy. Glenn Cox agreed to continue with treatment plan as noted below.       - Assist in understanding emotional, cognitive, and behavioral components of anxiety/depression/anger; develop skills to manage these symptoms  - Will learn to identify anxiety producing " thoughts and self statements and develop and use appropriate counter statements  - Assist in identifying and verbalizing feelings to clarify them and increase interpersonal awareness as to causes; use insight gained to assist in resolving negative emotions in a more effective manner  - Develop a plan with patient to include activities that will increase their satisfaction, fulfill their values, and improve quality of life. Evaluate effectiveness of plan; Introduce goal setting and problem solving skills  - Assist in increasing awareness of link between emotions, thoughts and behaviors; identify irrational beliefs and cognitive distortions; learn to challenge irrational beliefs and cognitive distortions and replace them with more realistic self statements (CBT)  - Assist in developing a crisis management plan; build crisis management skills; continue to assess for risk of suicide &/or self harm; continued reinforcement of use of plan; review and revise plan as needed  - Continue building distress tolerance skills for more effective emotional regulation; Assist in recognizing difference between feelings and thoughts; Assist in learning to act on thoughts rather than impulsive feelings; Assist them in moving from an external locus of control to an internal locus of control and building their sense of self. (DBT)  - Assist in learning to use problem solving skills (i.e. identify the problem, brainstorm possible options, evaluate each option, implement course of action, and evaluate results)  - Assist in developing an understanding of healthy life- style skills (i.e. nutrition, exercise, sleep, decreasing caffeine, etc.). Develop plan to increase healthy activities. Provide positive reinforcement for taking action steps toward a healthier life-style.  - Assist in developing a crisis management plan (i.e. warning signs, coping skills, contact people, use of crisis center, etc.). Review use and effectiveness of plan in  session. Assess for risk of harm (ideation, presence of a plan, past attempts, family history, current mood, etc)     Case discussed with Dr. Boggs

## 2023-11-16 ENCOUNTER — DOCUMENTATION (OUTPATIENT)
Dept: BEHAVIORAL HEALTH | Facility: HOSPITAL | Age: 23
End: 2023-11-16
Payer: COMMERCIAL

## 2023-11-16 NOTE — PROGRESS NOTES
"Start Time: 15:00        End Time: 16:00                      Date of 10/26/23  Service:  psychiatry     Glenn is a 22-year-old student (\"they/them\") with a PPHx of bipolar disorder and STEPHANIE and PMH or fibromyalgia/chronic pain, presenting for weekly therapy.    They cancelled therapy last week because of illness.      Content of therapy:   Glenn shared excitement and worry about Thanksgiving: their sister will come tomorrow for an extended holiday visit, and they'll see two of her best friends who live far away. They also described concern that their grandparents will say incindiary things about the violence in [facility], and expressed more concern for their sister than for themself. They also talked about double anniversary of Thanksgiving in their family, since that's when their grandparents brought their mom to the [place] at age 13.  They agreed with my observation that they appear to view feelings as a luxury.     Mental Status  Mood: \"Worried my grandparents will go on an Viraloid rant.\"   Affect:  calm and pleasant   Behavior:   Staring eye contact.   Suicidality: None, denied      Therapeutic Interventions:  -Guided patient through identifying and expressing emotions     -Supported patient in enhancing self-compassion     -Explored with patient coping with chronic illness/pain     Diagnosis:  Bipolar, Depressed     While Glenn's psychiatric illness and chronic pain chronically elevate her risk of harm to herself somewhat, she is currently at low acute risk of harm to herself and others. Factors decreasing her risk of harm to self/others include: no prior psychiatric hospitalization, no suicidal/violent ideation on interview, no history of legal trouble. Protective factors include: help-seeking, medication adherence, supportive mother and friends, and future-orientation.     Plan:  -Continue weekly therapy   -Continue psychotropic medications as prescribed by psychiatrist      This plan was developed " collaboratively with Glenn Cox. Discussed treatment progress and plans to continue therapy. Glenn Cox agreed to continue with treatment plan as noted below.       - Assist in understanding emotional, cognitive, and behavioral components of anxiety/depression/anger; develop skills to manage these symptoms  - Will learn to identify anxiety producing thoughts and self statements and develop and use appropriate counter statements  - Assist in identifying and verbalizing feelings to clarify them and increase interpersonal awareness as to causes; use insight gained to assist in resolving negative emotions in a more effective manner  - Develop a plan with patient to include activities that will increase their satisfaction, fulfill their values, and improve quality of life. Evaluate effectiveness of plan; Introduce goal setting and problem solving skills  - Assist in increasing awareness of link between emotions, thoughts and behaviors; identify irrational beliefs and cognitive distortions; learn to challenge irrational beliefs and cognitive distortions and replace them with more realistic self statements (CBT)  - Assist in developing a crisis management plan; build crisis management skills; continue to assess for risk of suicide &/or self harm; continued reinforcement of use of plan; review and revise plan as needed  - Continue building distress tolerance skills for more effective emotional regulation; Assist in recognizing difference between feelings and thoughts; Assist in learning to act on thoughts rather than impulsive feelings; Assist them in moving from an external locus of control to an internal locus of control and building their sense of self. (DBT)  - Assist in learning to use problem solving skills (i.e. identify the problem, brainstorm possible options, evaluate each option, implement course of action, and evaluate results)  - Assist in developing an understanding of healthy life- style skills  (i.e. nutrition, exercise, sleep, decreasing caffeine, etc.). Develop plan to increase healthy activities. Provide positive reinforcement for taking action steps toward a healthier life-style.  - Assist in developing a crisis management plan (i.e. warning signs, coping skills, contact people, use of crisis center, etc.). Review use and effectiveness of plan in session. Assess for risk of harm (ideation, presence of a plan, past attempts, family history, current mood, etc)     Will discuss with Dr. Boggs in supervision

## 2023-12-14 ENCOUNTER — DOCUMENTATION (OUTPATIENT)
Dept: BEHAVIORAL HEALTH | Facility: CLINIC | Age: 23
End: 2023-12-14
Payer: COMMERCIAL

## 2023-12-14 ENCOUNTER — DOCUMENTATION (OUTPATIENT)
Dept: BEHAVIORAL HEALTH | Facility: HOSPITAL | Age: 23
End: 2023-12-14
Payer: COMMERCIAL

## 2023-12-20 ENCOUNTER — DOCUMENTATION (OUTPATIENT)
Dept: BEHAVIORAL HEALTH | Facility: HOSPITAL | Age: 23
End: 2023-12-20
Payer: COMMERCIAL

## 2023-12-20 NOTE — PROGRESS NOTES
Glenn Cox is a 23 y.o. adult on day 0 of admission presenting with No Principal Problem: There is no principal problem currently on the Problem List. Please update the Problem List and refresh..      Subjective   ***       Objective     Last Recorded Vitals  There were no vitals taken for this visit.    Review of Systems    Psychiatric ROS - Adult  Anxiety: {Anxiety:30217867}  Depression: {Depression:73150727}  Delirium: {Delirium:60867402}  Psychosis: {Psychosis:66414879}  Kyara: {Kyara:44068999}  Safety Issues: {Safety Issues:94553176}  Psychiatric ROS Comment: ***    Physical Exam      Mental Status Exam  General: ***  Appearance: ***  Attitude: ***  Behavior: ***  Motor Activity: ***  Speech: ***  Mood: ***  Affect: ***  Thought Process: ***  Thought Content: ***  Thought Perception: ***  Cognition: ***  Insight: ***  Judgement: ***    Psychiatric Risk Assessment  Violence Risk Assessment: {Violence Risk Assessment:33974}  Acute Risk of Harm to Others is Considered: {Low/ModHigh:97313}   Suicide Risk Assessment: {Suicide Risk Assessment:81672}  Protective Factors against Suicide: {Protective Factors:68280}  Acute Risk of Harm to Self is Considered: {Low/ModHigh:17329}    Relevant Results  {If you would like to pull in Medications, type .meds     If you would like to pull in Lab results for the last 24 hours, type .[id number]    If you would like to pull in Imaging results, type .imgrslt :99}      {Link to Stroke Scoring tools - Link :99}       Assessment/Plan   Active Problems:  There are no active Hospital Problems.    ***                {This patient does not have an ACP note on file for this encounter, please fill one out - Advance Care Planning Activity :99}        Angelina Randle MD

## 2023-12-22 ENCOUNTER — DOCUMENTATION (OUTPATIENT)
Dept: BEHAVIORAL HEALTH | Facility: CLINIC | Age: 23
End: 2023-12-22
Payer: COMMERCIAL

## 2023-12-26 NOTE — PROGRESS NOTES
Glenn Cox is a 23 y.o. adult on day 0 of admission presenting with No Principal Problem: There is no principal problem currently on the Problem List. Please update the Problem List and refresh..      Subjective   ***       Objective     Last Recorded Vitals  There were no vitals taken for this visit.    Review of Systems    Psychiatric ROS - Adult  Anxiety: {Anxiety:87075556}  Depression: {Depression:63747465}  Delirium: {Delirium:53633607}  Psychosis: {Psychosis:82088970}  Kyara: {Kyara:30566519}  Safety Issues: {Safety Issues:96734821}  Psychiatric ROS Comment: ***    Physical Exam      Mental Status Exam  General: ***  Appearance: ***  Attitude: ***  Behavior: ***  Motor Activity: ***  Speech: ***  Mood: ***  Affect: ***  Thought Process: ***  Thought Content: ***  Thought Perception: ***  Cognition: ***  Insight: ***  Judgement: ***    Psychiatric Risk Assessment  Violence Risk Assessment: {Violence Risk Assessment:02193}  Acute Risk of Harm to Others is Considered: {Low/ModHigh:69652}   Suicide Risk Assessment: {Suicide Risk Assessment:75464}  Protective Factors against Suicide: {Protective Factors:75282}  Acute Risk of Harm to Self is Considered: {Low/ModHigh:19758}    Relevant Results  {If you would like to pull in Medications, type .meds     If you would like to pull in Lab results for the last 24 hours, type .[id number]    If you would like to pull in Imaging results, type .imgrslt :99}      {Link to Stroke Scoring tools - Link :99}       Assessment/Plan   Active Problems:  There are no active Hospital Problems.    ***                {This patient does not have an ACP note on file for this encounter, please fill one out - Advance Care Planning Activity :99}        Angelina Randle MD

## 2023-12-28 ENCOUNTER — DOCUMENTATION (OUTPATIENT)
Dept: BEHAVIORAL HEALTH | Facility: CLINIC | Age: 23
End: 2023-12-28
Payer: COMMERCIAL

## 2023-12-29 NOTE — PSYCHOTHERAPY
"Glenn is a 23-year-old student (\"they/them\") with a PPHx of bipolar disorder and STEPHANIE and PMH or fibromyalgia/chronic pain, presenting for weekly therapy.      Content of therapy:   Glenn talked about their graduate school applications, which are partially done and will be due in January. They expressed some concerns about the challenge of living away from home and a preference to stay in [place], because they think it'll be easier for them to complete grad school closer to family.      Mental Status  Mood: \"I want everything to be easy all the time\"   Affect:  calm and pleasant   Behavior:   Good eye contact.   Suicidality: None, denied      Therapeutic Interventions:  -Guided patient through identifying and expressing emotions     -Supported patient in enhancing self-compassion     -Explored with patient coping with chronic illness/pain     Diagnosis:  Bipolar, Depressed     While Glenn's psychiatric illness and chronic pain chronically elevate her risk of harm to herself somewhat, she is currently at low acute risk of harm to herself and others. Factors decreasing her risk of harm to self/others include: no prior psychiatric hospitalization, no suicidal/violent ideation on interview, no history of legal trouble. Protective factors include: help-seeking, medication adherence, supportive mother and friends, and future-orientation.     Plan:  -Continue weekly therapy   -Continue psychotropic medications as prescribed by psychiatrist      This plan was developed collaboratively with Glenn Cox. Discussed treatment progress and plans to continue therapy. Glenn Cox agreed to continue with treatment plan as noted below.       - Assist in understanding emotional, cognitive, and behavioral components of anxiety/depression/anger; develop skills to manage these symptoms  - Will learn to identify anxiety producing thoughts and self statements and develop and use appropriate counter statements  - " Assist in identifying and verbalizing feelings to clarify them and increase interpersonal awareness as to causes; use insight gained to assist in resolving negative emotions in a more effective manner  - Develop a plan with patient to include activities that will increase their satisfaction, fulfill their values, and improve quality of life. Evaluate effectiveness of plan; Introduce goal setting and problem solving skills  - Assist in increasing awareness of link between emotions, thoughts and behaviors; identify irrational beliefs and cognitive distortions; learn to challenge irrational beliefs and cognitive distortions and replace them with more realistic self statements (CBT)  - Assist in developing a crisis management plan; build crisis management skills; continue to assess for risk of suicide &/or self harm; continued reinforcement of use of plan; review and revise plan as needed  - Continue building distress tolerance skills for more effective emotional regulation; Assist in recognizing difference between feelings and thoughts; Assist in learning to act on thoughts rather than impulsive feelings; Assist them in moving from an external locus of control to an internal locus of control and building their sense of self. (DBT)  - Assist in learning to use problem solving skills (i.e. identify the problem, brainstorm possible options, evaluate each option, implement course of action, and evaluate results)  - Assist in developing an understanding of healthy life- style skills (i.e. nutrition, exercise, sleep, decreasing caffeine, etc.). Develop plan to increase healthy activities. Provide positive reinforcement for taking action steps toward a healthier life-style.  - Assist in developing a crisis management plan (i.e. warning signs, coping skills, contact people, use of crisis center, etc.). Review use and effectiveness of plan in session. Assess for risk of harm (ideation, presence of a plan, past attempts, family  history, current mood, etc)     Discussed with Dr. Boggs in supervision

## 2024-01-02 ENCOUNTER — DOCUMENTATION (OUTPATIENT)
Dept: BEHAVIORAL HEALTH | Facility: CLINIC | Age: 24
End: 2024-01-02
Payer: COMMERCIAL

## 2024-01-03 NOTE — PROGRESS NOTES
"Start Time: 15:00        End Time: 16:00                      Date of 1/3/24  Service:  psychiatry     Glenn is a 23-year-old student (\"they/them\") with a PPHx of bipolar disorder and STEPHANIE and PMH or fibromyalgia/chronic pain, presenting for her weekly therapy.      Content of therapy:   Glenn talked about how she first felt the need to keep secrets and how that continued into her first relationship in high school. We discussed inconsistency between prior expectations that she keep secrets and her family reputation as someone at risk of stealing the beans. She also shared her theory that her mom might've had her as an attempt to fix her father.      Mental Status  Mood: euthymic   Affect:  calm and pleasant   Behavior:   Appropriate eye contact.   Suicidality: Did not endorse      Therapeutic Interventions:  -Guided patient through identifying and expressing emotions     -Supported patient in enhancing self-compassion     -Explored with patient coping with chronic illness/pain     Diagnosis:  Bipolar, Depressed     While Glenn's psychiatric illness and chronic pain chronically elevate her risk of harm to herself somewhat, she is currently at low acute risk of harm to herself and others. Factors decreasing her risk of harm to self/others include: no prior psychiatric hospitalization, no suicidal/violent ideation on interview, no history of legal trouble. Protective factors include: help-seeking, medication adherence, supportive mother and friends, and future-orientation.     Plan  -Continue weekly therapy      Plan:  -Continue weekly therapy   -Continue psychotropic medications as prescribed by psychiatrist      This plan was developed collaboratively with Glenn Cox. Discussed treatment progress and plans to continue therapy. Glenn Cox agreed to continue with treatment plan as noted below.       - Assist in understanding emotional, cognitive, and behavioral components of " anxiety/depression/anger; develop skills to manage these symptoms  - Will learn to identify anxiety producing thoughts and self statements and develop and use appropriate counter statements  - Assist in identifying and verbalizing feelings to clarify them and increase interpersonal awareness as to causes; use insight gained to assist in resolving negative emotions in a more effective manner  - Develop a plan with patient to include activities that will increase their satisfaction, fulfill their values, and improve quality of life. Evaluate effectiveness of plan; Introduce goal setting and problem solving skills  - Assist in increasing awareness of link between emotions, thoughts and behaviors; identify irrational beliefs and cognitive distortions; learn to challenge irrational beliefs and cognitive distortions and replace them with more realistic self statements (CBT)  - Assist in developing a crisis management plan; build crisis management skills; continue to assess for risk of suicide &/or self harm; continued reinforcement of use of plan; review and revise plan as needed  - Continue building distress tolerance skills for more effective emotional regulation; Assist in recognizing difference between feelings and thoughts; Assist in learning to act on thoughts rather than impulsive feelings; Assist them in moving from an external locus of control to an internal locus of control and building their sense of self. (DBT)  - Assist in learning to use problem solving skills (i.e. identify the problem, brainstorm possible options, evaluate each option, implement course of action, and evaluate results)  - Assist in developing an understanding of healthy life- style skills (i.e. nutrition, exercise, sleep, decreasing caffeine, etc.). Develop plan to increase healthy activities. Provide positive reinforcement for taking action steps toward a healthier life-style.  - Assist in developing a crisis management plan (i.e. warning  signs, coping skills, contact people, use of crisis center, etc.). Review use and effectiveness of plan in session. Assess for risk of harm (ideation, presence of a plan, past attempts, family history, current mood, etc)     Will discuss case with Dr. Boggs

## 2024-01-03 NOTE — PROGRESS NOTES
"Start Time: 15:00        End Time: 16:00                      Date of 12/14/23  Service:  psychiatry     Glenn is a 23-year-old student (\"they/them\") with a PPHx of bipolar disorder and STEPHANIE and PMH or fibromyalgia/chronic pain, presenting for her weekly therapy.      Content of therapy:   Glenn discussed finals, which have been going well. We also discussed their negative self-evaluation, specifically their sense that they're frozen in time (after having had to move back home following a challenging freshman year). They shared a wish to feel less dependent on their mother.      Mental Status  Mood: euthymic   Affect:  calm and pleasant   Behavior:   Appropriate eye contact.   Suicidality: Did not endorse      Therapeutic Interventions:  -Guided patient through identifying and expressing emotions     -Supported patient in enhancing self-compassion     -Explored with patient coping with chronic illness/pain     Diagnosis:  Bipolar, Depressed     While Saeeds psychiatric illness and chronic pain chronically elevate her risk of harm to herself somewhat, she is currently at low acute risk of harm to herself and others. Factors decreasing her risk of harm to self/others include: no prior psychiatric hospitalization, no suicidal/violent ideation on interview, no history of legal trouble. Protective factors include: help-seeking, medication adherence, supportive mother and friends, and future-orientation.       Plan:  -Continue weekly therapy   -Continue psychotropic medications as prescribed by psychiatrist      This plan was developed collaboratively with Glenn Cox. Discussed treatment progress and plans to continue therapy. Glenn Cox agreed to continue with treatment plan as noted below.       - Assist in understanding emotional, cognitive, and behavioral components of anxiety/depression/anger; develop skills to manage these symptoms    Discussed this case with Dr Boggs in supervision  "

## 2024-01-03 NOTE — PROGRESS NOTES
"Start Time: 15:00        End Time: 16:00                      Date of 12/22/23  Service:  psychiatry     Glenn is a 23-year-old student (\"they/them\") with a PPHx of bipolar disorder and STEPHANIE and PMH or fibromyalgia/chronic pain, presenting for her weekly therapy.      Content of therapy:   Glenn shared a moment they were proud of, in which they objected to their grandfather's effort to tell them, a [employer] student, that [employer] students all feel afraid to leave their dorms because of anti-semitism. We also discussed negative self-talk and Glenn's tendency to be really harsh on themself.      Mental Status  Mood: euthymic   Affect:  calm and pleasant   Behavior:   Appropriate eye contact.   Suicidality: Did not endorse      Therapeutic Interventions:  -Guided patient through identifying and expressing emotions     -Supported patient in enhancing self-compassion     -Explored with patient coping with chronic illness/pain     Diagnosis:  Bipolar, Depressed     While Glenn's psychiatric illness and chronic pain chronically elevate her risk of harm to herself somewhat, she is currently at low acute risk of harm to herself and others. Factors decreasing her risk of harm to self/others include: no prior psychiatric hospitalization, no suicidal/violent ideation on interview, no history of legal trouble. Protective factors include: help-seeking, medication adherence, supportive mother and friends, and future-orientation.       Plan:  -Continue weekly therapy   -Continue psychotropic medications as prescribed by psychiatrist      This plan was developed collaboratively with Glenn Cox. Discussed treatment progress and plans to continue therapy. Glenn Cox agreed to continue with treatment plan as noted below.       - Assist in understanding emotional, cognitive, and behavioral components of anxiety/depression/anger; develop skills to manage these symptoms    Discussed this case with " Dr Boggs in supervision

## 2024-01-18 ENCOUNTER — DOCUMENTATION (OUTPATIENT)
Dept: PSYCHIATRY | Facility: HOSPITAL | Age: 24
End: 2024-01-18

## 2024-01-25 ENCOUNTER — DOCUMENTATION (OUTPATIENT)
Dept: PSYCHIATRY | Facility: HOSPITAL | Age: 24
End: 2024-01-25

## 2024-01-25 NOTE — PROGRESS NOTES
"Start Time: 15:00        End Time: 16:00                      Date of 1/3/24  Service:  psychiatry     Glenn is a 23-year-old student (\"they/them\") with a PPHx of bipolar disorder and STEPHANIE and PMH or fibromyalgia/chronic pain, presenting for her weekly therapy.      Content of therapy:   Glenn discussed how their mom gets stressed when guests are coming over, and some recent conflict with their mom, first related to guests, and then before they said good night to their mother. They shared a very unkind comment that their mother made and explained why they interpreted that unkindness as an effort to express affection.     Mental Status  Mood: \"I've made peace with my body.\"   Affect:  calm and pleasant   Behavior:   Appropriate eye contact.   Suicidality: Did not endorse      Therapeutic Interventions:  -Guided patient through identifying and expressing emotions     -Supported patient in enhancing self-compassion     -Explored with patient coping with chronic illness/pain     Diagnosis:  Bipolar, Depressed     While Glenn's psychiatric illness and chronic pain chronically elevate her risk of harm to herself somewhat, she is currently at low acute risk of harm to herself and others. Factors decreasing her risk of harm to self/others include: no prior psychiatric hospitalization, no suicidal/violent ideation on interview, no history of legal trouble. Protective factors include: help-seeking, medication adherence, supportive mother and friends, and future-orientation.     Plan:  -Continue weekly therapy   -Continue psychotropic medications as prescribed by psychiatrist      This plan was developed collaboratively with Glenn Cox. Discussed treatment progress and plans to continue therapy. Glenn Cox agreed to continue with treatment plan as noted below.       - Assist in understanding emotional, cognitive, and behavioral components of anxiety/depression/anger; develop skills to manage these " symptoms  - Will learn to identify anxiety producing thoughts and self statements and develop and use appropriate counter statements  - Assist in identifying and verbalizing feelings to clarify them and increase interpersonal awareness as to causes; use insight gained to assist in resolving negative emotions in a more effective manner  - Develop a plan with patient to include activities that will increase their satisfaction, fulfill their values, and improve quality of life. Evaluate effectiveness of plan; Introduce goal setting and problem solving skills  - Assist in increasing awareness of link between emotions, thoughts and behaviors; identify irrational beliefs and cognitive distortions; learn to challenge irrational beliefs and cognitive distortions and replace them with more realistic self statements (CBT)  - Assist in developing a crisis management plan; build crisis management skills; continue to assess for risk of suicide &/or self harm; continued reinforcement of use of plan; review and revise plan as needed  - Continue building distress tolerance skills for more effective emotional regulation; Assist in recognizing difference between feelings and thoughts; Assist in learning to act on thoughts rather than impulsive feelings; Assist them in moving from an external locus of control to an internal locus of control and building their sense of self. (DBT)  - Assist in learning to use problem solving skills (i.e. identify the problem, brainstorm possible options, evaluate each option, implement course of action, and evaluate results)  - Assist in developing an understanding of healthy life- style skills (i.e. nutrition, exercise, sleep, decreasing caffeine, etc.). Develop plan to increase healthy activities. Provide positive reinforcement for taking action steps toward a healthier life-style.  - Assist in developing a crisis management plan (i.e. warning signs, coping skills, contact people, use of crisis  [place], etc.). Review use and effectiveness of plan in session. Assess for risk of harm (ideation, presence of a plan, past attempts, family history, current mood, etc)     Will discuss case with Dr. Boggs

## 2024-01-25 NOTE — PROGRESS NOTES
"Start Time: 15:00        End Time: 16:00                      Date of 1/3/24  Service:  psychiatry     Glenn is a 23-year-old student (\"they/them\") with a PPHx of bipolar disorder and STEPHANIE and PMH or fibromyalgia/chronic pain, presenting for her weekly therapy.      Content of therapy:   Glenn shared described a flare up of back pain which they interpret as a sign that they're under stress, although they had a hard time identifying the source of stress. They suggested that perhaps school assignments were the source, and agreed with my suggestion that grad school applications could also be a factor. When I asked whether they talked with their mom any more about the conflict that emerged last week, they said no. They then shared their family's description of them as an unusually annoying child (standing in front of the TV while adults were trying to watch something, demanding attention), which they seemed to agree with unquestioningly. When they had foot surgery to correct a painfully flat foot in 9th grade, they became more dependent on family for basic things, and their mom has said since that the surgery improved their personality. When I asked whether they felt like their personality was lacking before that, they downplayed that comment, saying that was a joke their mom made, and it wasn't intended seriously.     I asked whether they experience any conflict related to living at home, which seems emotionally unsafe, and they suggested that they need to keep the peace and thus cannot experience any conflict.      Mental Status  Mood: \"I feel like I'm stressed out because of the way my body feels\" (fibromyalgia pain flare)   Affect:  calm and pleasant   Behavior:   Appropriate eye contact.   Suicidality: Did not endorse      Therapeutic Interventions:  -Guided patient through identifying and expressing emotions     -Supported patient in enhancing self-compassion     -Explored with patient coping with chronic " illness/pain     Diagnosis:  Bipolar, Depressed     While Glenn's psychiatric illness and chronic pain chronically elevate her risk of harm to herself somewhat, she is currently at low acute risk of harm to herself and others. Factors decreasing her risk of harm to self/others include: no prior psychiatric hospitalization, no suicidal/violent ideation on interview, no history of legal trouble. Protective factors include: help-seeking, medication adherence, supportive mother and friends, and future-orientation.     Plan:  -Continue weekly therapy   -Continue psychotropic medications as prescribed by psychiatrist      This plan was developed collaboratively with Glenn Castilloman. Discussed treatment progress and plans to continue therapy. Glenn Cox agreed to continue with treatment plan as noted below.       - Assist in understanding emotional, cognitive, and behavioral components of anxiety/depression/anger; develop skills to manage these symptoms  - Will learn to identify anxiety producing thoughts and self statements and develop and use appropriate counter statements  - Assist in identifying and verbalizing feelings to clarify them and increase interpersonal awareness as to causes; use insight gained to assist in resolving negative emotions in a more effective manner  - Develop a plan with patient to include activities that will increase their satisfaction, fulfill their values, and improve quality of life. Evaluate effectiveness of plan; Introduce goal setting and problem solving skills  - Assist in increasing awareness of link between emotions, thoughts and behaviors; identify irrational beliefs and cognitive distortions; learn to challenge irrational beliefs and cognitive distortions and replace them with more realistic self statements (CBT)  - Assist in developing a crisis management plan; build crisis management skills; continue to assess for risk of suicide &/or self harm; continued reinforcement  of use of plan; review and revise plan as needed  - Continue building distress tolerance skills for more effective emotional regulation; Assist in recognizing difference between feelings and thoughts; Assist in learning to act on thoughts rather than impulsive feelings; Assist them in moving from an external locus of control to an internal locus of control and building their sense of self. (DBT)  - Assist in learning to use problem solving skills (i.e. identify the problem, brainstorm possible options, evaluate each option, implement course of action, and evaluate results)  - Assist in developing an understanding of healthy life- style skills (i.e. nutrition, exercise, sleep, decreasing caffeine, etc.). Develop plan to increase healthy activities. Provide positive reinforcement for taking action steps toward a healthier life-style.  - Assist in developing a crisis management plan (i.e. warning signs, coping skills, contact people, use of crisis center, etc.). Review use and effectiveness of plan in session. Assess for risk of harm (ideation, presence of a plan, past attempts, family history, current mood, etc)     Will discuss case with Dr. Boggs

## 2024-01-29 ENCOUNTER — DOCUMENTATION (OUTPATIENT)
Dept: BEHAVIORAL HEALTH | Facility: HOSPITAL | Age: 24
End: 2024-01-29
Payer: COMMERCIAL

## 2024-02-08 ENCOUNTER — DOCUMENTATION (OUTPATIENT)
Dept: BEHAVIORAL HEALTH | Facility: HOSPITAL | Age: 24
End: 2024-02-08
Payer: COMMERCIAL

## 2024-02-08 NOTE — PROGRESS NOTES
"Start Time: 15:00        End Time: 16:00                      Date of 1/3/24  Service:  psychiatry     Glenn is a 23-year-old student (\"they/them\") with a PPHx of bipolar disorder and STEPHANIE and PMH or fibromyalgia/chronic pain, presenting for her weekly therapy.      Content of therapy:   Glenn seemed much more optimistic about how life will look after they move out of their parents' home. Specifically, they're looking forward to the freedom to invite friends over without needing to coordinate with their parents. Although they described their oldest friends from childhood as comfortably welcome at their home, they felt that it was very awkward inviting their recent ex over (while they were still dating). They told me more about their oldest friends, including a spring break trip last year to visit one of these friends in [place]. They also shared that they think they're bad at art.     Mental Status  Mood: \"Honestly I think I'll be OK\" (when moving out of parents' home)   Affect:  calm and pleasant   Behavior:   Appropriate eye contact.   Suicidality: Did not endorse      Therapeutic Interventions:  -Guided patient through identifying and expressing emotions     -Supported patient in enhancing self-compassion     -Explored with patient coping with chronic illness/pain     Diagnosis:  Bipolar, Depressed     While Glenn's psychiatric illness and chronic pain chronically elevate her risk of harm to herself somewhat, she is currently at low acute risk of harm to herself and others. Factors decreasing her risk of harm to self/others include: no prior psychiatric hospitalization, no suicidal/violent ideation on interview, no history of legal trouble. Protective factors include: help-seeking, medication adherence, supportive mother and friends, and future-orientation.     Plan:  -Continue weekly therapy   -Continue psychotropic medications as prescribed by psychiatrist      This plan was developed collaboratively " with Glenn Cox. Discussed treatment progress and plans to continue therapy. Glenn Cox agreed to continue with treatment plan as noted below.       - Assist in understanding emotional, cognitive, and behavioral components of anxiety/depression/anger; develop skills to manage these symptoms  - Will learn to identify anxiety producing thoughts and self statements and develop and use appropriate counter statements  - Assist in identifying and verbalizing feelings to clarify them and increase interpersonal awareness as to causes; use insight gained to assist in resolving negative emotions in a more effective manner  - Assist in increasing awareness of link between emotions, thoughts and behaviors; identify irrational beliefs and cognitive distortions; learn to challenge irrational beliefs and cognitive distortions and replace them with more realistic self statements (CBT)  - Assist in developing a crisis management plan; build crisis management skills; continue to assess for risk of suicide &/or self harm; continued reinforcement of use of plan; review and revise plan as needed  - Continue building distress tolerance skills for more effective emotional regulation; Assist in recognizing difference between feelings and thoughts; Assist in learning to act on thoughts rather than impulsive feelings; Assist them in moving from an external locus of control to an internal locus of control and building their sense of self. (DBT)  - Assist in learning to use problem solving skills (i.e. identify the problem, brainstorm possible options, evaluate each option, implement course of action, and evaluate results)  - Assist in developing a crisis management plan (i.e. warning signs, coping skills, contact people, use of crisis center, etc.). Review use and effectiveness of plan in session. Assess for risk of harm (ideation, presence of a plan, past attempts, family history, current mood, etc)     Will discuss case with  Dr. Boggs

## 2024-02-08 NOTE — PROGRESS NOTES
" 2/8/24  Service:  psychiatry     Glenn is a 23-year-old student (\"they/them\") with a PPHx of bipolar disorder and STEPHANIE and PMH or fibromyalgia/chronic pain, presenting for her weekly therapy.      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 They also received interview from two masters programs they applied to, but they don't want to celebrate until after interviews are complete. They also described some of the discomfort from when they were dating someone over the summer who overshared traumatic experiences, but bristled at repeated efforts to understand what inderjit them to this former partner. I asked them to reflect on their goals for therapy over the next week, since their prior goal was to get a better sense of how to manage their depression and how to recognize when their depression is worsening. They told me that they're sleeping 13 hours a day and feel very low energy.      Mental Status  Mood: \"Wish I were attracted to my new friend because he's so nice\"   Affect:  calm and pleasant   Behavior:   Appropriate eye contact.   Suicidality: Did not endorse      Therapeutic Interventions:  -Guided patient through identifying and expressing emotions     -Supported patient in enhancing self-compassion     -Explored with patient coping with chronic illness/pain     Diagnosis:  Bipolar, Depressed     While Glenn's psychiatric illness and chronic pain chronically elevate her risk of harm to herself somewhat, she is currently at low acute risk of harm to herself and others. Factors decreasing her risk of harm to self/others include: no prior psychiatric hospitalization, no suicidal/violent ideation on interview, no history of legal trouble. Protective factors include: help-seeking, medication adherence, supportive mother and friends, and future-orientation.     Plan:  -Continue weekly therapy "   -Continue psychotropic medications as prescribed by psychiatrist      This plan was developed collaboratively with Glenn Cox. Discussed treatment progress and plans to continue therapy. Glenn Cox agreed to continue with treatment plan as noted below.       - Assist in understanding emotional, cognitive, and behavioral components of anxiety/depression/anger; develop skills to manage these symptoms  - Develop a plan with patient to include activities that will increase their satisfaction, fulfill their values, and improve quality of life. Evaluate effectiveness of plan; Introduce goal setting and problem solving skills  - Assist in increasing awareness of link between emotions, thoughts and behaviors; identify irrational beliefs and cognitive distortions; learn to challenge irrational beliefs and cognitive distortions and replace them with more realistic self statements (CBT)  - Assist in developing a crisis management plan; build crisis management skills; continue to assess for risk of suicide &/or self harm; continued reinforcement of use of plan; review and revise plan as needed  - Continue building distress tolerance skills for more effective emotional regulation; Assist in recognizing difference between feelings and thoughts; Assist in learning to act on thoughts rather than impulsive feelings; Assist them in moving from an external locus of control to an internal locus of control and building their sense of self. (DBT)  - Assist in learning to use problem solving skills (i.e. identify the problem, brainstorm possible options, evaluate each option, implement course of action, and evaluate results)     Will discuss case with Dr. Boggs

## 2024-02-15 ENCOUNTER — DOCUMENTATION (OUTPATIENT)
Dept: BEHAVIORAL HEALTH | Facility: CLINIC | Age: 24
End: 2024-02-15
Payer: COMMERCIAL

## 2024-02-22 ENCOUNTER — DOCUMENTATION (OUTPATIENT)
Dept: BEHAVIORAL HEALTH | Facility: CLINIC | Age: 24
End: 2024-02-22
Payer: COMMERCIAL

## 2024-02-29 ENCOUNTER — DOCUMENTATION (OUTPATIENT)
Dept: BEHAVIORAL HEALTH | Facility: CLINIC | Age: 24
End: 2024-02-29
Payer: COMMERCIAL

## 2024-03-07 NOTE — PROGRESS NOTES
" 2/15/24  Service:  psychiatry     Glenn is a 23-year-old student (\"they/them\") with a PPHx of bipolar disorder and STEPHANIE and PMH or fibromyalgia/chronic pain, presenting for her weekly therapy.      Content of therapy:   Glenn started therapy by saying that their mom suggested we discuss their clinginess.  We discussed their mom's attitude of exporting problems she perceives in [place] for their therapist (me) to fix, and I raised the possibility that what their mom perceives as clinginess is, instead, a thirst for human connection that Glenn's current relationships are failing to provide. Glenn gave an analogy of their mental illness as a purse they keep at arm's length, \"not in my brain.\" When I asked them to describe the contents of the purse to me, they had a hard time answering that question. They feel like they'll need to carry this purse forever, although they feel that it can get smaller. Asked again to describe the mental illness they depersonalized, they replied \"I don't know what left because I don't know what was there. What happened to me feels like a mystery. I just don't know what went wrong.\"    When I tried to highlight their mom's cold and unpredictable interactions, they said \"no matter what she does, I will be grateful to her.\"    At the end, when I validated Glenn's desire to connect and pointed out their willingness to blame just themself for dysfunctional relationships, they cried.     Mental Status  Mood: \"My mom thinks I should talk about my clinginess... it's the mental illness, not me\"   Affect:  Tearful at the end, for the first time during our sessions   Behavior:   Appropriate eye contact.   Suicidality: Did not endorse      Therapeutic Interventions:  -Guided patient through identifying and expressing emotions     -Supported patient in enhancing self-compassion     -Explored with patient coping with chronic illness/pain     Diagnosis:  Bipolar, Depressed     While " Glenn's psychiatric illness and chronic pain chronically elevate her risk of harm to herself somewhat, she is currently at low acute risk of harm to herself and others. Factors decreasing her risk of harm to self/others include: no prior psychiatric hospitalization, no suicidal/violent ideation on interview, no history of legal trouble. Protective factors include: help-seeking, medication adherence, supportive sister, and future-orientation.     Plan:  -Continue weekly therapy   -Continue psychotropic medications as prescribed by psychiatrist      This plan was developed collaboratively with Glenn Cox. Discussed treatment progress and plans to continue therapy. Glenn Cox agreed to continue with treatment plan as noted below.       - Assist in understanding emotional, cognitive, and behavioral components of anxiety/depression/anger; develop skills to manage these symptoms  - Develop a plan with patient to include activities that will increase their satisfaction, fulfill their values, and improve quality of life. Evaluate effectiveness of plan; Introduce goal setting and problem solving skills  - Assist in increasing awareness of link between emotions, thoughts and behaviors; identify irrational beliefs and cognitive distortions; learn to challenge irrational beliefs and cognitive distortions and replace them with more realistic self statements (CBT)  - Assist in developing a crisis management plan; build crisis management skills; continue to assess for risk of suicide &/or self harm; continued reinforcement of use of plan; review and revise plan as needed  - Continue building distress tolerance skills for more effective emotional regulation; Assist in recognizing difference between feelings and thoughts; Assist in learning to act on thoughts rather than impulsive feelings; Assist them in moving from an external locus of control to an internal locus of control and building their sense of self.  (DBT)  - Assist in learning to use problem solving skills (i.e. identify the problem, brainstorm possible options, evaluate each option, implement course of action, and evaluate results)     Will discuss case with Dr. Boggs

## 2024-03-07 NOTE — PROGRESS NOTES
" 2/15/24  Service:  psychiatry     Glenn is a 23-year-old student (\"they/them\") with a PPHx of bipolar disorder and STEPHANIE and PMH or fibromyalgia/chronic pain, presenting for her weekly therapy.      Content of therapy:   Glenn shared a distressing dream that woke them up crying-- in this dream, they saw a child who'd been abandoned, took care of the little boy, and then found themself looking at the little boy reuniting with his neglectful mother. 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 We discussed this dream and a recurrent theme of their first ex featuring in their dreams.      Mental Status  Mood: \"Incomplete... I wasn't done with that relationship... worried I'll never have a good relationship\"   Affect:  tense   Behavior:   Appropriate eye contact.   Suicidality: Did not endorse      Therapeutic Interventions:  -Guided patient through identifying and expressing emotions     -Supported patient in enhancing self-compassion     -Explored with patient coping with chronic illness/pain     Diagnosis:  Bipolar, Depressed     While Glenn's psychiatric illness and chronic pain chronically elevate her risk of harm to herself somewhat, she is currently at low acute risk of harm to herself and others. Factors decreasing her risk of harm to self/others include: no prior psychiatric hospitalization, no suicidal/violent ideation on interview, no history of legal trouble. Protective factors include: help-seeking, medication adherence, supportive sister, and future-orientation.     Plan:  -Continue weekly therapy   -Continue psychotropic medications as prescribed by psychiatrist      This plan was developed collaboratively with Glenn Cox. Discussed treatment progress and plans to continue therapy. Glenn Cox agreed to continue with treatment plan as noted below.       - Assist in understanding emotional, cognitive, and behavioral components of " anxiety/depression/anger; develop skills to manage these symptoms  - Develop a plan with patient to include activities that will increase their satisfaction, fulfill their values, and improve quality of life. Evaluate effectiveness of plan; Introduce goal setting and problem solving skills  - Assist in increasing awareness of link between emotions, thoughts and behaviors; identify irrational beliefs and cognitive distortions; learn to challenge irrational beliefs and cognitive distortions and replace them with more realistic self statements (CBT)  - Assist in developing a crisis management plan; build crisis management skills; continue to assess for risk of suicide &/or self harm; continued reinforcement of use of plan; review and revise plan as needed  - Continue building distress tolerance skills for more effective emotional regulation; Assist in recognizing difference between feelings and thoughts; Assist in learning to act on thoughts rather than impulsive feelings; Assist them in moving from an external locus of control to an internal locus of control and building their sense of self. (DBT)  - Assist in learning to use problem solving skills (i.e. identify the problem, brainstorm possible options, evaluate each option, implement course of action, and evaluate results)     Will discuss case with Dr. Boggs

## 2024-03-14 ENCOUNTER — OFFICE VISIT (OUTPATIENT)
Dept: OPHTHALMOLOGY | Facility: CLINIC | Age: 24
End: 2024-03-14
Payer: COMMERCIAL

## 2024-03-14 DIAGNOSIS — H52.13 MYOPIA OF BOTH EYES: Primary | ICD-10-CM

## 2024-03-14 PROCEDURE — 92015 DETERMINE REFRACTIVE STATE: CPT | Performed by: OPTOMETRIST

## 2024-03-14 PROCEDURE — 92014 COMPRE OPH EXAM EST PT 1/>: CPT | Performed by: OPTOMETRIST

## 2024-03-14 PROCEDURE — 92310 CONTACT LENS FITTING OU: CPT | Performed by: OPTOMETRIST

## 2024-03-14 ASSESSMENT — CONF VISUAL FIELD
OD_INFERIOR_TEMPORAL_RESTRICTION: 0
OS_NORMAL: 1
OD_SUPERIOR_TEMPORAL_RESTRICTION: 0
OD_INFERIOR_NASAL_RESTRICTION: 0
OS_SUPERIOR_NASAL_RESTRICTION: 0
OS_INFERIOR_NASAL_RESTRICTION: 0
OD_NORMAL: 1
OS_SUPERIOR_TEMPORAL_RESTRICTION: 0
OS_INFERIOR_TEMPORAL_RESTRICTION: 0
OD_SUPERIOR_NASAL_RESTRICTION: 0

## 2024-03-14 ASSESSMENT — REFRACTION_WEARINGRX
OS_SPHERE: -3.00
OS_CYLINDER: SPHERE
OS_AXIS: 022
OD_SPHERE: -3.25
OS_SPHERE: -3.50
OS_CYLINDER: -1.00
OD_AXIS: 174
OD_SPHERE: -3.25
OD_CYLINDER: -0.75
OD_CYLINDER: SPHERE

## 2024-03-14 ASSESSMENT — REFRACTION_MANIFEST
OD_AXIS: 175
OS_SPHERE: -4.00
OS_AXIS: 025
OD_SPHERE: -3.75
OD_CYLINDER: -0.75
OS_CYLINDER: -1.00

## 2024-03-14 ASSESSMENT — ENCOUNTER SYMPTOMS
CARDIOVASCULAR NEGATIVE: 0
GASTROINTESTINAL NEGATIVE: 0
RESPIRATORY NEGATIVE: 0
MUSCULOSKELETAL NEGATIVE: 0
ALLERGIC/IMMUNOLOGIC NEGATIVE: 0
CONSTITUTIONAL NEGATIVE: 0
ENDOCRINE NEGATIVE: 0
PSYCHIATRIC NEGATIVE: 0
EYES NEGATIVE: 1
NEUROLOGICAL NEGATIVE: 0
HEMATOLOGIC/LYMPHATIC NEGATIVE: 0

## 2024-03-14 ASSESSMENT — CUP TO DISC RATIO
OS_RATIO: .2
OD_RATIO: .2

## 2024-03-14 ASSESSMENT — SLIT LAMP EXAM - LIDS
COMMENTS: GOOD POSITION
COMMENTS: GOOD POSITION

## 2024-03-14 ASSESSMENT — EXTERNAL EXAM - RIGHT EYE: OD_EXAM: NORMAL

## 2024-03-14 ASSESSMENT — TONOMETRY
OS_IOP_MMHG: 12
IOP_METHOD: GOLDMANN APPLANATION
OD_IOP_MMHG: 12

## 2024-03-14 ASSESSMENT — VISUAL ACUITY
METHOD: SNELLEN - LINEAR
VA_OR_OS_CURRENT_RX: 20/20
CORRECTION_TYPE: CONTACTS
OS_CC: 20/20
VA_OR_OD_CURRENT_RX: 20/20
OD_CC: 20/20

## 2024-03-14 ASSESSMENT — REFRACTION_CURRENTRX
OD_CYLINDER: SPHERE
OS_CYLINDER: SPHERE
OD_SPHERE: -3.75
OS_DIAMETER: 14.2
OS_BASECURVE: 8.5
OS_SPHERE: -3.75
OS_BRAND: ACUVUE 1 DAY MOIST
OD_DIAMETER: 14.2
OD_BASECURVE: 8.5
OD_BRAND: ACUVUE 1 DAY MOIST

## 2024-03-14 ASSESSMENT — EXTERNAL EXAM - LEFT EYE: OS_EXAM: NORMAL

## 2024-03-14 NOTE — PROGRESS NOTES
A spectacle prescription was dispensed to be used as needed. A contact lens prescription was dispensed at the patient`s request. Mild astigmatism and will defer correcting in CLs for now. Pt likes the 1 day moist and used to wear 2 week oasys prior. Will stay with 1 day moist

## 2024-04-03 ENCOUNTER — OFFICE VISIT (OUTPATIENT)
Dept: PRIMARY CARE | Facility: CLINIC | Age: 24
End: 2024-04-03
Payer: COMMERCIAL

## 2024-04-03 VITALS
BODY MASS INDEX: 28.93 KG/M2 | HEIGHT: 62 IN | OXYGEN SATURATION: 96 % | SYSTOLIC BLOOD PRESSURE: 112 MMHG | HEART RATE: 91 BPM | TEMPERATURE: 97.1 F | DIASTOLIC BLOOD PRESSURE: 72 MMHG | WEIGHT: 157.2 LBS

## 2024-04-03 DIAGNOSIS — G47.10 EXCESSIVE SLEEPINESS: ICD-10-CM

## 2024-04-03 DIAGNOSIS — L85.8 KERATOSIS PILARIS: ICD-10-CM

## 2024-04-03 DIAGNOSIS — R63.5 WEIGHT GAIN: ICD-10-CM

## 2024-04-03 DIAGNOSIS — E28.2 PCOS (POLYCYSTIC OVARIAN SYNDROME): ICD-10-CM

## 2024-04-03 DIAGNOSIS — K21.9 GASTROESOPHAGEAL REFLUX DISEASE, UNSPECIFIED WHETHER ESOPHAGITIS PRESENT: Primary | ICD-10-CM

## 2024-04-03 PROBLEM — B36.0 TINEA VERSICOLOR: Status: RESOLVED | Noted: 2023-10-17 | Resolved: 2024-04-03

## 2024-04-03 PROBLEM — L42 PITYRIASIS ROSEA: Status: RESOLVED | Noted: 2023-09-20 | Resolved: 2024-04-03

## 2024-04-03 LAB
25(OH)D3 SERPL-MCNC: 16 NG/ML (ref 30–100)
ALBUMIN SERPL BCP-MCNC: 4.3 G/DL (ref 3.4–5)
ALP SERPL-CCNC: 80 U/L (ref 33–120)
ALT SERPL W P-5'-P-CCNC: 11 U/L (ref 7–52)
ANION GAP SERPL CALC-SCNC: 14 MMOL/L (ref 10–20)
AST SERPL W P-5'-P-CCNC: 12 U/L (ref 9–39)
BASOPHILS # BLD AUTO: 0.03 X10*3/UL (ref 0–0.1)
BASOPHILS NFR BLD AUTO: 0.5 %
BILIRUB SERPL-MCNC: 0.2 MG/DL (ref 0–1.2)
BUN SERPL-MCNC: 8 MG/DL (ref 6–23)
CALCIUM SERPL-MCNC: 10.1 MG/DL (ref 8.6–10.6)
CHLORIDE SERPL-SCNC: 103 MMOL/L (ref 98–107)
CO2 SERPL-SCNC: 26 MMOL/L (ref 21–32)
CORTIS SERPL-MCNC: 17.4 UG/DL (ref 2.5–20)
CREAT SERPL-MCNC: 0.86 MG/DL (ref 0.5–1.3)
DHEA-S SERPL-MCNC: 338 UG/DL (ref 65–640)
EGFRCR SERPLBLD CKD-EPI 2021: >90 ML/MIN/1.73M*2
EOSINOPHIL # BLD AUTO: 0.07 X10*3/UL (ref 0–0.7)
EOSINOPHIL NFR BLD AUTO: 1.1 %
ERYTHROCYTE [DISTWIDTH] IN BLOOD BY AUTOMATED COUNT: 12.7 % (ref 11.5–14.5)
EST. AVERAGE GLUCOSE BLD GHB EST-MCNC: 97 MG/DL
ESTRADIOL SERPL-MCNC: <19 PG/ML
FSH SERPL-ACNC: <0.9 IU/L
GLUCOSE SERPL-MCNC: 86 MG/DL (ref 74–99)
HBA1C MFR BLD: 5 %
HCT VFR BLD AUTO: 41.3 % (ref 36–52)
HGB BLD-MCNC: 13.3 G/DL (ref 12–17.5)
IMM GRANULOCYTES # BLD AUTO: 0.02 X10*3/UL (ref 0–0.7)
IMM GRANULOCYTES NFR BLD AUTO: 0.3 % (ref 0–0.9)
INSULIN SERPL-ACNC: 37 UIU/ML (ref 3–25)
LH SERPL-ACNC: <0.1 IU/L
LYMPHOCYTES # BLD AUTO: 1.71 X10*3/UL (ref 1.2–4.8)
LYMPHOCYTES NFR BLD AUTO: 25.8 %
MCH RBC QN AUTO: 28.1 PG (ref 26–34)
MCHC RBC AUTO-ENTMCNC: 32.2 G/DL (ref 32–36)
MCV RBC AUTO: 87 FL (ref 80–100)
MONOCYTES # BLD AUTO: 0.61 X10*3/UL (ref 0.1–1)
MONOCYTES NFR BLD AUTO: 9.2 %
NEUTROPHILS # BLD AUTO: 4.18 X10*3/UL (ref 1.2–7.7)
NEUTROPHILS NFR BLD AUTO: 63.1 %
NRBC BLD-RTO: 0 /100 WBCS (ref 0–0)
PLATELET # BLD AUTO: 379 X10*3/UL (ref 150–450)
POTASSIUM SERPL-SCNC: 4.5 MMOL/L (ref 3.5–5.3)
PROLACTIN SERPL-MCNC: 69.1 UG/L (ref 2–20)
PROT SERPL-MCNC: 7.5 G/DL (ref 6.4–8.2)
RBC # BLD AUTO: 4.73 X10*6/UL (ref 4–5.9)
SODIUM SERPL-SCNC: 138 MMOL/L (ref 136–145)
TESTOST SERPL-MCNC: <30 NG/DL (ref 0–1000)
THYROPEROXIDASE AB SERPL-ACNC: 43 IU/ML
TSH SERPL-ACNC: 2.12 MIU/L (ref 0.44–3.98)
WBC # BLD AUTO: 6.6 X10*3/UL (ref 4.4–11.3)

## 2024-04-03 PROCEDURE — 83036 HEMOGLOBIN GLYCOSYLATED A1C: CPT

## 2024-04-03 PROCEDURE — 82533 TOTAL CORTISOL: CPT

## 2024-04-03 PROCEDURE — 80053 COMPREHEN METABOLIC PANEL: CPT

## 2024-04-03 PROCEDURE — 83002 ASSAY OF GONADOTROPIN (LH): CPT

## 2024-04-03 PROCEDURE — 82670 ASSAY OF TOTAL ESTRADIOL: CPT

## 2024-04-03 PROCEDURE — 84146 ASSAY OF PROLACTIN: CPT

## 2024-04-03 PROCEDURE — 99215 OFFICE O/P EST HI 40 MIN: CPT | Performed by: FAMILY MEDICINE

## 2024-04-03 PROCEDURE — 83001 ASSAY OF GONADOTROPIN (FSH): CPT

## 2024-04-03 PROCEDURE — 36415 COLL VENOUS BLD VENIPUNCTURE: CPT

## 2024-04-03 PROCEDURE — 82306 VITAMIN D 25 HYDROXY: CPT

## 2024-04-03 PROCEDURE — 1036F TOBACCO NON-USER: CPT | Performed by: FAMILY MEDICINE

## 2024-04-03 PROCEDURE — 84443 ASSAY THYROID STIM HORMONE: CPT

## 2024-04-03 PROCEDURE — 83525 ASSAY OF INSULIN: CPT

## 2024-04-03 PROCEDURE — 84403 ASSAY OF TOTAL TESTOSTERONE: CPT

## 2024-04-03 PROCEDURE — 85025 COMPLETE CBC W/AUTO DIFF WBC: CPT

## 2024-04-03 PROCEDURE — 84702 CHORIONIC GONADOTROPIN TEST: CPT

## 2024-04-03 PROCEDURE — 86376 MICROSOMAL ANTIBODY EACH: CPT

## 2024-04-03 PROCEDURE — 82627 DEHYDROEPIANDROSTERONE: CPT

## 2024-04-03 RX ORDER — OMEPRAZOLE 40 MG/1
40 CAPSULE, DELAYED RELEASE ORAL
Qty: 14 CAPSULE | Refills: 0 | Status: SHIPPED | OUTPATIENT
Start: 2024-04-03 | End: 2024-04-17

## 2024-04-03 ASSESSMENT — ANXIETY QUESTIONNAIRES
GAD7 TOTAL SCORE: 0
1. FEELING NERVOUS, ANXIOUS, OR ON EDGE: NOT AT ALL
2. NOT BEING ABLE TO STOP OR CONTROL WORRYING: NOT AT ALL
6. BECOMING EASILY ANNOYED OR IRRITABLE: NOT AT ALL
IF YOU CHECKED OFF ANY PROBLEMS ON THIS QUESTIONNAIRE, HOW DIFFICULT HAVE THESE PROBLEMS MADE IT FOR YOU TO DO YOUR WORK, TAKE CARE OF THINGS AT HOME, OR GET ALONG WITH OTHER PEOPLE: NOT DIFFICULT AT ALL
5. BEING SO RESTLESS THAT IT IS HARD TO SIT STILL: NOT AT ALL
7. FEELING AFRAID AS IF SOMETHING AWFUL MIGHT HAPPEN: NOT AT ALL
3. WORRYING TOO MUCH ABOUT DIFFERENT THINGS: NOT AT ALL
4. TROUBLE RELAXING: NOT AT ALL

## 2024-04-03 ASSESSMENT — ENCOUNTER SYMPTOMS
DEPRESSION: 0
OCCASIONAL FEELINGS OF UNSTEADINESS: 0
LOSS OF SENSATION IN FEET: 0

## 2024-04-03 ASSESSMENT — PATIENT HEALTH QUESTIONNAIRE - PHQ9
1. LITTLE INTEREST OR PLEASURE IN DOING THINGS: NOT AT ALL
2. FEELING DOWN, DEPRESSED OR HOPELESS: NOT AT ALL
SUM OF ALL RESPONSES TO PHQ9 QUESTIONS 1 AND 2: 0

## 2024-04-03 ASSESSMENT — PAIN SCALES - GENERAL: PAINLEVEL: 3

## 2024-04-03 NOTE — ASSESSMENT & PLAN NOTE
Check labs to evaluate  Discussed with pt the different diagnosis criteria  Will also evaluate for Hashimoto's as her symptoms do have some overlap

## 2024-04-03 NOTE — PROGRESS NOTES
"Subjective   Patient ID: Glenn Cox is a 23 y.o. adult who presents for Polycystic Ovary Syndrome (Pt believes they have PCOS), Med Refill (Vit D), Heartburn, Abdominal Pain (Upper left quad), Sleeping Problem (Oversleeping), Fatigue, Headache (Increased amount/), and Weight Gain (Pt worried about weight gain 130 up to 157 last year/).    HPI   Presents for evaluation of several concerns:    Wonders if she has PCOS because of the following symptoms that seem to be worsened-abnormal weight gain in abdomen, bloating after eating, hair falling out easily, temperature sensitivity, fatigue throughout the day, temperature irregular, when she had cycles they were heavy and irregular, dry skin, frequent headaches, a lot of pelvic cramps, craves sweets frequently, mood swings    Ear wax seems to build up, denies any history of impaction    Vitamin D; wonders if she needs a repeat supplement course    Keratosis pilaris on the arms, not sure what to do with the issue    Having abdominal pain with eating.  Not every time, does not seem to have a pattern to what she is eating in regards to the pain.  States that she does get acidic taste in back of her mouth frequently.  Had similar symptoms about a year ago and was given antacids which did help.  Has not seen GI and worried about an ulcer.    Has excessive daytime sleepiness.  States that can sleep 12 hours at nighttime and then throughout the day will continue to nap and sleep.  When she works she never feels well rested.  Always feels like she is falling asleep.  Thinks she gets restful sleep but not sure.  States that this started about 3 years ago.    Review of Systems  Negative unless noted in HPI    Objective   /72 (BP Location: Left arm, Patient Position: Sitting, BP Cuff Size: Large adult)   Pulse 91   Temp 36.2 °C (97.1 °F) (Temporal)   Ht 1.575 m (5' 2\")   Wt 71.3 kg (157 lb 3.2 oz)   LMP 03/01/2022   SpO2 96%   BMI 28.75 kg/m²     Physical " Exam  Constitutional:       Appearance: Normal appearance.   HENT:      Right Ear: Tympanic membrane and ear canal normal. There is no impacted cerumen.      Left Ear: Tympanic membrane and ear canal normal. There is no impacted cerumen.   Pulmonary:      Effort: Pulmonary effort is normal.   Skin:     General: Skin is warm.      Comments: Fine papular rash on bilateral upper arms   Neurological:      General: No focal deficit present.      Mental Status: Glenn Cox is alert.   Psychiatric:         Mood and Affect: Mood normal.         Assessment/Plan   Problem List Items Addressed This Visit             ICD-10-CM    Gastroesophageal reflux disease - Primary K21.9     Suspect abdominal discomfort related to GERD  Will trial 2 week course of PPI  Due to recurrent symptoms and now more frequent will also refer to GI evaluation         Relevant Medications    omeprazole (PriLOSEC) 40 mg DR capsule    Other Relevant Orders    Referral to Gastroenterology    FSH    Prolactin level    CBC and Auto Differential    PCOS (polycystic ovarian syndrome) E28.2     Check labs to evaluate  Discussed with pt the different diagnosis criteria  Will also evaluate for Hashimoto's as her symptoms do have some overlap         Relevant Orders    Luteinizing hormone    FSH    Prolactin level    Estradiol    DHEA-Sulfate    Hemoglobin A1C    Comprehensive metabolic panel    Insulin, random    Excessive sleepiness G47.10     Differential includes organic cause vs mental health issue vs sleep disorder  Check labs as outlined with PCOS  Refer to sleep clinic as well for further evaluation  Continue to try to remain active, avoid excessive naps  May benefit from stimulant medication but will await sleep clinic evaluation         Relevant Orders    Insulin, random    Referral to Adult Sleep Medicine    Vitamin D 25-Hydroxy,Total (for eval of Vitamin D levels)    Keratosis pilaris L85.8     Discussed condition as well as OTC treatment           Other Visit Diagnoses         Codes    Weight gain     R63.5    Relevant Orders    Luteinizing hormone    FSH    Prolactin level    Estradiol    DHEA-Sulfate    Testosterone    Hemoglobin A1C    Comprehensive metabolic panel    TSH with reflex to Free T4 if abnormal    Thyroid Peroxidase (TPO) Antibody    Cortisol

## 2024-04-03 NOTE — ASSESSMENT & PLAN NOTE
Suspect abdominal discomfort related to GERD  Will trial 2 week course of PPI  Due to recurrent symptoms and now more frequent will also refer to GI evaluation

## 2024-04-03 NOTE — ASSESSMENT & PLAN NOTE
Differential includes organic cause vs mental health issue vs sleep disorder  Check labs as outlined with PCOS  Refer to sleep clinic as well for further evaluation  Continue to try to remain active, avoid excessive naps  May benefit from stimulant medication but will await sleep clinic evaluation

## 2024-04-04 DIAGNOSIS — E28.2 PCOS (POLYCYSTIC OVARIAN SYNDROME): Primary | ICD-10-CM

## 2024-04-04 LAB — B-HCG SERPL-ACNC: <3 MIU/ML

## 2024-04-05 DIAGNOSIS — R79.89 LOW SERUM VITAMIN D: Primary | ICD-10-CM

## 2024-04-05 RX ORDER — ERGOCALCIFEROL 1.25 MG/1
50000 CAPSULE ORAL
Qty: 8 CAPSULE | Refills: 0 | Status: SHIPPED | OUTPATIENT
Start: 2024-04-07 | End: 2024-06-02

## 2024-04-09 ENCOUNTER — OFFICE VISIT (OUTPATIENT)
Dept: PRIMARY CARE | Facility: CLINIC | Age: 24
End: 2024-04-09
Payer: COMMERCIAL

## 2024-04-09 VITALS
OXYGEN SATURATION: 98 % | WEIGHT: 155 LBS | HEART RATE: 88 BPM | DIASTOLIC BLOOD PRESSURE: 60 MMHG | BODY MASS INDEX: 28.52 KG/M2 | HEIGHT: 62 IN | SYSTOLIC BLOOD PRESSURE: 98 MMHG

## 2024-04-09 DIAGNOSIS — E28.2 PCOS (POLYCYSTIC OVARIAN SYNDROME): ICD-10-CM

## 2024-04-09 DIAGNOSIS — R79.89 ELEVATED PROLACTIN LEVEL: ICD-10-CM

## 2024-04-09 DIAGNOSIS — E88.819 INSULIN RESISTANCE: Primary | ICD-10-CM

## 2024-04-09 PROCEDURE — 1036F TOBACCO NON-USER: CPT | Performed by: FAMILY MEDICINE

## 2024-04-09 PROCEDURE — 99215 OFFICE O/P EST HI 40 MIN: CPT | Performed by: FAMILY MEDICINE

## 2024-04-09 ASSESSMENT — ENCOUNTER SYMPTOMS
OCCASIONAL FEELINGS OF UNSTEADINESS: 0
LOSS OF SENSATION IN FEET: 0
DEPRESSION: 0

## 2024-04-09 NOTE — PROGRESS NOTES
"Subjective   Patient ID: Glenn Cox is a 23 y.o. adult who presents for Follow-up.    HPI   Patient presents with mother to discuss results from recent lab testing.    There was concern for possible PCOS based on symptoms they were having. Patient is though on continuous hormonal birth control with use of the Nuvaring without a one week break.  They have been using it this way for more than six months.  Also, relevant to lab results they are on Perphenazine from psychiatry and has been for awhile, not a new medication.  Stable mental health on this regimen.      Review of Systems  Negative unless noted in HPI    Objective   BP 98/60 (BP Location: Right arm, Patient Position: Sitting, BP Cuff Size: Adult)   Pulse 88   Ht 1.575 m (5' 2\")   Wt 70.3 kg (155 lb)   LMP 03/01/2022   SpO2 98%   BMI 28.35 kg/m²     Physical Exam  Constitutional:       Appearance: Normal appearance.   Neurological:      Mental Status: Glenn is alert. Mental status is at baseline.   Psychiatric:         Mood and Affect: Mood normal.         Assessment/Plan   Problem List Items Addressed This Visit             ICD-10-CM    PCOS (polycystic ovarian syndrome) E28.2     Clinically suspect diagnosis of PCOS especially with evidence of insulin resistance  Hormone levels are not helpful in diagnosis as pt is on continuous combined hormone contraceptive which falsely affects resulting hormone levels   Again, discussed insulin resistance and how it would help with PCOS symptoms  If ever off of Nuvaring or contraceptive in general then can retest hormones or if worsening/breakthrough symptoms noted         Insulin resistance - Primary E88.819     Extensive discussion regarding implications of elevated random insulin level  Discussed medication versus lifestyle modification  Based on age and overall goals, do recommend initial lifestyle modification and pt agreeable  Discussed possible nutrition referral but will start with personal " research and diet  Can follow up as needed         Elevated prolactin level R79.89     Discussed with pt and mother elevated level  Based on review of medical history and causes of elevation, strongly suspect that it is related to long term use of Perphenazine based on medications moderate known elevation in Prolactin  We discussed options for further evaluation including titrating off medication, fasting prolactin, ongoing follow up  Secondary to very likely association between medication and elevation as well as overall success on medication, at this time will continue to monitor  Did discuss if any new symptoms such as worsening headache, new vision changes or any other neurologic symptoms then recommend earlier reevaluation  If ever off of medication then also would encourage fasting prolactin level follow up

## 2024-04-10 PROBLEM — N93.9 ABNORMAL UTERINE BLEEDING (AUB): Status: RESOLVED | Noted: 2023-10-17 | Resolved: 2024-04-10

## 2024-04-10 PROBLEM — R79.89 ELEVATED PROLACTIN LEVEL: Status: ACTIVE | Noted: 2024-04-10

## 2024-04-10 PROBLEM — N94.6 DYSMENORRHEA IN ADOLESCENT: Status: RESOLVED | Noted: 2023-10-17 | Resolved: 2024-04-10

## 2024-04-10 PROBLEM — E88.819 INSULIN RESISTANCE: Status: ACTIVE | Noted: 2024-04-10

## 2024-04-10 NOTE — ASSESSMENT & PLAN NOTE
Extensive discussion regarding implications of elevated random insulin level  Discussed medication versus lifestyle modification  Based on age and overall goals, do recommend initial lifestyle modification and pt agreeable  Discussed possible nutrition referral but will start with personal research and diet  Can follow up as needed

## 2024-04-10 NOTE — ASSESSMENT & PLAN NOTE
Discussed with pt and mother elevated level  Based on review of medical history and causes of elevation, strongly suspect that it is related to long term use of Perphenazine based on medications moderate known elevation in Prolactin  We discussed options for further evaluation including titrating off medication, fasting prolactin, ongoing follow up  Secondary to very likely association between medication and elevation as well as overall success on medication, at this time will continue to monitor  Did discuss if any new symptoms such as worsening headache, new vision changes or any other neurologic symptoms then recommend earlier reevaluation  If ever off of medication then also would encourage fasting prolactin level follow up

## 2024-04-10 NOTE — ASSESSMENT & PLAN NOTE
Clinically suspect diagnosis of PCOS especially with evidence of insulin resistance  Hormone levels are not helpful in diagnosis as pt is on continuous combined hormone contraceptive which falsely affects resulting hormone levels   Again, discussed insulin resistance and how it would help with PCOS symptoms  If ever off of Nuvaring or contraceptive in general then can retest hormones or if worsening/breakthrough symptoms noted

## 2024-05-24 ENCOUNTER — OFFICE VISIT (OUTPATIENT)
Dept: SLEEP MEDICINE | Facility: CLINIC | Age: 24
End: 2024-05-24
Payer: COMMERCIAL

## 2024-05-24 VITALS
HEIGHT: 62 IN | OXYGEN SATURATION: 97 % | WEIGHT: 156 LBS | RESPIRATION RATE: 16 BRPM | DIASTOLIC BLOOD PRESSURE: 83 MMHG | SYSTOLIC BLOOD PRESSURE: 116 MMHG | BODY MASS INDEX: 28.71 KG/M2 | HEART RATE: 92 BPM

## 2024-05-24 DIAGNOSIS — G47.10 EXCESSIVE SLEEPINESS: Primary | ICD-10-CM

## 2024-05-24 DIAGNOSIS — R40.0 DAYTIME SLEEPINESS: ICD-10-CM

## 2024-05-24 PROCEDURE — G2211 COMPLEX E/M VISIT ADD ON: HCPCS | Performed by: INTERNAL MEDICINE

## 2024-05-24 PROCEDURE — 99214 OFFICE O/P EST MOD 30 MIN: CPT | Performed by: INTERNAL MEDICINE

## 2024-05-24 PROCEDURE — 99204 OFFICE O/P NEW MOD 45 MIN: CPT | Performed by: INTERNAL MEDICINE

## 2024-05-24 ASSESSMENT — SLEEP AND FATIGUE QUESTIONNAIRES
HOW LIKELY ARE YOU TO NOD OFF OR FALL ASLEEP WHILE SITTING AND TALKING TO SOMEONE: WOULD NEVER DOZE
SLEEP_PROBLEM_INTERFERES_DAILY_ACTIVITIES: VERY MUCH NOTICEABLE
DIFFICULTY_STAYING_ASLEEP: MODERATE
SITING INACTIVE IN A PUBLIC PLACE LIKE A CLASS ROOM OR A MOVIE THEATER: WOULD NEVER DOZE
HOW LIKELY ARE YOU TO NOD OFF OR FALL ASLEEP IN A CAR, WHILE STOPPED FOR A FEW MINUTES IN TRAFFIC: WOULD NEVER DOZE
HOW LIKELY ARE YOU TO NOD OFF OR FALL ASLEEP WHILE SITTING AND READING: SLIGHT CHANCE OF DOZING
SLEEP_PROBLEM_NOTICEABLE_TO_OTHERS: VERY MUCH NOTICEABLE
ESS-CHAD TOTAL SCORE: 10
HOW LIKELY ARE YOU TO NOD OFF OR FALL ASLEEP WHILE LYING DOWN TO REST IN THE AFTERNOON WHEN CIRCUMSTANCES PERMIT: HIGH CHANCE OF DOZING
HOW LIKELY ARE YOU TO NOD OFF OR FALL ASLEEP WHILE WATCHING TV: SLIGHT CHANCE OF DOZING
HOW LIKELY ARE YOU TO NOD OFF OR FALL ASLEEP WHEN YOU ARE A PASSENGER IN A CAR FOR AN HOUR WITHOUT A BREAK: HIGH CHANCE OF DOZING
DIFFICULTY_FALLING_ASLEEP: MODERATE
WORRIED_DISTRESSED_DUE_TO_SLEEP: A LITTLE
HOW LIKELY ARE YOU TO NOD OFF OR FALL ASLEEP WHILE SITTING QUIETLY AFTER LUNCH WITHOUT ALCOHOL: MODERATE CHANCE OF DOZING
SATISFACTION_WITH_CURRENT_SLEEP_PATTERN: VERY DISSATISFIED

## 2024-05-24 ASSESSMENT — PATIENT HEALTH QUESTIONNAIRE - PHQ9
SUM OF ALL RESPONSES TO PHQ9 QUESTIONS 1 AND 2: 0
1. LITTLE INTEREST OR PLEASURE IN DOING THINGS: NOT AT ALL
2. FEELING DOWN, DEPRESSED OR HOPELESS: NOT AT ALL

## 2024-05-24 ASSESSMENT — PAIN SCALES - GENERAL: PAINLEVEL: 3

## 2024-05-24 NOTE — PROGRESS NOTES
[facility] SLEEP MEDICINE CLINIC  NEW CONSULT VISIT NOTE    PCP: Marilu Carvalho MD  Referred by: Marilu Carvalho MD    HISTORY OF PRESENT ILLNESS     Patient ID: Glenn Cox is a 23 y.o. adult who presents to [facility] for a comprehensive sleep medicine evaluation with concerns of excessive daytime sleepiness / fatigue.    Patient is here alone today.  To review, patient's medical history is notable for seasonal allergies, GERD, PCOS, insulin resistance, anxiety, depression, and fibromyalgia.    In 2020 pandemic, patient started napping a lot during senior high school. She naps as soon as she log unto her online classes. She eventually had COVID infection in 2023. Per patient, she works as  from 2 to 5 PM 4 days of the week in school then goes to her classes at 6 PM for 4 days a week. She does not have classes or work from Friday to Sunday. However, this year she will have classes at 9 AM and wants to get to the bottom of her sleepiness. She denies snoring but the pharynx is hyperemic. She never had sleep study done in the past.     SLEEP-WAKE SCHEDULE  Bedtime:  11 PM daily  Subjective sleep latency: 1-2 hours (Patient states that she feels she goes in and out of sleep)  Number of awakenings:  4 times per night spontaneously for unknown reasons or to go to bathroom  Nocturia: Yes (2-3 times to go to bathroom)  Falls back asleep right away  Final wake time:  11 AM daily  Out of bed time:  11 AM to 12 NN  daily  Shift work: No  Naps: once daily for 3 days per week starting at 2 PM for 4 hours  Feels rested after a nap: No  Average sleep duration (excluding naps): 10-12 hours     SLEEP ENVIRONMENT  Sleep location: bed  Sleep status: sleeps alone  Preferred sleep position: back and side  Room is dark: Yes  Room is quiet: Yes  Room is cool: Yes    SLEEP HABITS  Smoking: no  ETOH: no  Marijuana: yes recreational marijuana once a month   Caffeine: 1 cup coffee  daily on work days  Sleep aids: no    SLEEP ROS  Night symptoms: POSITIVE for nasal congestion , mouth breathing, heartburn or sour taste in mouth at night, and nocturia and NEGATIVE for snoring, witnessed apnea, wake up gasping and/or choking for air, night sweats during sleep, waking up with racing heart, and nocturnal cough  Morning symptoms: POSITIVE for unrefreshing sleep, morning headache, morning dry mouth, and morning sore throat  Daytime symptoms POSITIVE for excessive daytime sleepiness, fatigue, and irritability in daytime and NEGATIVE for trouble remembering things in daytime, trouble staying focused in daytime, and drowsy driving  Hypersomnia / narcolepsy symptoms: POSITIVE for sleep paralysis and sleep-related hallucinations. Patient denies cataplexy. Sleep paralysis happens 4x per week, last episode  2 days ago. Hallucinations is in the middle of night  Parasomnia symptoms: POSITIVE for sleep talking and sleep walking during childhood  Movements in sleep: POSITIVE for sleep-related bruxism    RLS screen: Patient denies RLS symptoms.    WEIGHT: gained 30 lbs in 3 years     Claustrophobia: No    REVIEW OF SYSTEMS     All other systems have been reviewed and are negative.    ALLERGIES     Allergies   Allergen Reactions    Penicillin Hives    Penicillins Hives    Mineral Oil-Pegs (Bulk) Other    Adhesive Rash       MEDICATIONS     Current Outpatient Medications   Medication Sig Dispense Refill    buPROPion XL (Wellbutrin XL) 300 mg 24 hr tablet TAKE ONE TABLET BY MOUTH EVERY DAY IN THE MORNING for depression      cyanocobalamin (Vitamin B-12) 1,000 mcg tablet Take 100 mcg by mouth once daily.      docusate sodium (Colace) 100 mg capsule Take 1 capsule (100 mg) by mouth twice a day.      ergocalciferol, vitamin D2, (VITAMIN D2 ORAL) Take by mouth.      etonogestreL-ethinyl estradioL (Nuvaring) 0.12-0.015 mg/24 hr vaginal ring Insert 1 each into the vagina every 28 (twenty-eight) days. leave in place for 3  "consecutive weeks and remove for 1 week (7 days) 3 each 3    Heartburn Relief, famotidine, 10 mg tablet Take 1 tablet (10 mg) by mouth once daily at bedtime.      IRON, FERROUS SULFATE, ORAL Take 25 mg by mouth.      ondansetron ODT (Zofran-ODT) 4 mg disintegrating tablet Take 1 tablet (4 mg) by mouth every 6 hours if needed for nausea.      perphenazine 4 mg tablet Take 2 tablets (8 mg) by mouth 2 times a day. 2 tab daily, 2 4mg tab      propranolol (Inderal) 10 mg tablet TAKE ONE TABLET BY MOUTH TWO TIMES A DAY as needed for anxiety      ergocalciferol (Vitamin D-2) 1.25 MG (43390 UT) capsule Take 1 capsule (50,000 Units) by mouth 1 (one) time per week. (Patient not taking: Reported on 5/24/2024) 8 capsule 0    omeprazole (PriLOSEC) 40 mg DR capsule Take 1 capsule (40 mg) by mouth once daily in the morning. Take before meals for 14 days. Do not crush or chew. 14 capsule 0     No current facility-administered medications for this visit.       PAST HISTORIES     PERTINENT PAST MEDICAL HISTORY: See HPI    PERTINENT PAST SURGICAL HISTORY for Sleep Medicine:  non-contributory    PERTINENT FAMILY HISTORY for Sleep Medicine:  Patient denies family history of sleep apnea. Great grand uncle had sleep apnea    PERTINENT SOCIAL HISTORY:  Glenn  reports that Glenn has never smoked. Glenn has never been exposed to tobacco smoke. Glenn has never used smokeless tobacco. Glenn reports that Glenn does not currently use alcohol. Glenn reports current drug use. Drug: Marijuana. Glenn currently lives  with parents  and employed as     Active Problems, Allergy List, Medication List, and PMH/PSH/FH/Social Hx have been reviewed and reconciled in chart. No significant changes unless documented in the pertinent chart section. Updates made when necessary.     PHYSICAL EXAM     VITAL SIGNS: /83   Pulse 92   Resp 16   Ht 1.575 m (5' 2\")   Wt 70.8 kg (156 lb)   SpO2 97%   BMI " 28.53 kg/m²     NECK CIRCUMFERENCE: 14.25 inches    ESS: 10  HILDA: 17  STOPBAN    Physical Exam  Constitutional: Awake, not in distress  Head: normocephalic, atraumatic  Craniofacial: no retrognathia  Sinus: no tenderness to palpation  Nose: +nose rings, hard to breathe in either nostril alone (R>L)  Dental: Class 1 bite, no overjet, no crossbite  Palate: Normal   Oropharynx: Hagen tongue position 3, Mallampati 3, lateral wall narrowing grade 3   Tonsils: +1  Uvula: midline on phonation, hyperemia of pharyngeal walls  Tongue: Midline on protrusion, no Tongue scalloping, + macroglossia  Lungs: Clear to auscultation bilateral, no rales  Heart: Regular rate and rhythm, no murmurs  Skin: Warm, no rash  Neuro: No tremors, moves all extremities  Psych: alert and oriented to time, place, and person    RESULTS/DATA     Iron (ug/dL)   Date Value   2023 37     Iron Saturation (%)   Date Value   2023 10 (L)     TIBC (ug/dL)   Date Value   2023 388       Bicarbonate   Date Value Ref Range Status   2024 26 21 - 32 mmol/L Final       ASSESSMENT/PLAN     Glenn Cox is a 23 y.o. adult who is seen today in [facility] for the following problems:.     EXCESSIVE DAYTIME SLEEPINESS and FATIGUE  - Differential diagnoses are JOSH, PLMD, narcolepsy, and insufficient sleep syndrome, idiopathic hypersomnia, insomnia (e.g. from poor sleep hygiene, psychophysiologic, etc), mental and affective disorders (e.g. depression, anxiety, etc), or medication-induced.  - In order to better assess the underlying disorder, a baseline full night polysomnogram (PSG) followed by a multiple sleep latency test (MSLT) have been ordered. The PSG ensures a regular night of sleep prior to the MSLT and rules out other sleep disorders such as JOSH or periodic limb movement disorder. The MSLT assesses mean time to sleep onset during 4-5 naps as well as for presence of sleep-onset REM periods.   -  Urine drug screen was ordered to be collected and performed on the morning of MSLT.  - Some of the medications the patient is taking may affect the results of this study, such as Bupropion. Patient has been instructed to discontinue these medications for two weeks prior to the test but need to get approval and instructions from prescribing provider or psychiatrist.   - Patient has been advised to maintain regular sleep schedule 2 weeks prior to the study and to avoid sleep deprivation.  Gave a sleep diary to patient to document sleep habits and sleep-wake schedule. Advised patient to bring the sleep diary to sleep lab for review. Further treatment plan will be discussed after reviewing the results of this PSG-MSLT.   - Educated patient about PSG-MSLT procedure / driving precautions  - Advised patient not to drive vehicle or operate heavy machinery when sleepy. A person driving while sleepy is 5 times more likely to have an accident. If you feel sleepy, pull over and take a short power nap (sleep for less than 30 minutes). Otherwise, ask somebody to drive you.  - Patient agreed to the plan and verbalized understanding.    SEASONAL ALLERGIES  - Start fluticasone nasal spray 2 sprays per nostril once daily 1 hour before bedtime.  - If there is inadequate or lack of improvement on the above intranasal steroid spray, consider adding Azelastine nasal spray, 2 sprays per nostril once daily in the morning.   - Alternatively, may add cetirizine or loratadine 10 mg once daily.   - Educated patient on proper use of intranasal sprays.       Follow-up in 2-3 weeks after sleep study.    All of patient's questions were answered. Glenn verbalizes understanding and agreement with my assessment and plan. Refer to after-visit-summary (AVS) for patient education and more details on sleep study preparation, troubleshooting issues with PAP usage, proper cleaning instructions of PAP supplies, and usual recommended replacement schedule  for each of the PAP supplies.

## 2024-06-08 NOTE — PATIENT INSTRUCTIONS
"Thank you for coming to the Sleep Medicine Clinic today! Your sleep medicine provider today was: Renée Wiggins MD Below is a summary of your treatment plan, patient education, other important information, and our contact numbers.      TREATMENT PLAN     - Get the following sleep study scheduled: overnight sleep study followed by multiple daytime nap testing called PSG-MSLT.  - Please read the \"Patient Education\" section below for more detailed information. Try implementing tips, reminders, strategies, and supportive management.   - If not yet done, please sign up for EatWith to make a future schedule, send prescription requests, or send messages.    Follow-up Appointment:   Follow-up in 2-3 weeks after sleep study.    PATIENT EDUCATION     Sleep Testing for narcolepsy: The best and ideal way is to do a baseline overnight sleep study in the sleep  laboratory called polysomnogram (PSG) followed by multiple nap trials in daytime called multiple sleep latency test (MSLT). The PSG ensures a regular night of sleep prior to the MSLT and rules out other sleep disorders such as sleep apnea or periodic limb movement disorder. In some patients who are already on CPAP at home but still have persistently excessive daytime sleepiness, the PSG is done with CPAP on current home settings. On the other hand, the MSLT documents whether the patient falls asleep, how long it takes to fall asleep and how quickly REM sleep follows. The MSLT is done by allowing patient to have 5 nap opportunities with each nap lasting for 20 minutes at 2-hour interval.     Important preparations prior doing PSG-MSLT:    Keep a regular and consistent sleep-wake schedule 2 weeks prior to the study. Avoid having sleep deprivation.   You will be given a sleep diary that you need to fill out 2 weeks prior to testing and bring them with you to give our technologists at the time of testing. If you did not receive a copy of sleep diary, please call my office to " ask for a copy.   Some of the medications you are taking may affect the results of MSLT. If you are currently taking any stimulants, please discontinue these medications at least 3-5 days prior testing. If you are taking anti-depressants, please discontinue them 2 weeks prior testing if feasible and you will need approval and tapering off instructions from your psychiatrist or prescribing provider.   For the overnight in-lab sleep study, please make sure to bring toiletries, a comfy pillow, additional warm blankets, and any nighttime medications (include as-needed inhaler, pain pill, etc) that you may regularly take.   Bring a complete list of your medications with you and please record the last time you took your medications.   Be sure to eat dinner before you arrive as we generally do not provide dinner inside the sleep testing center.   Bring something to do between the nap trials. You will not be able to sleep for 2 hours between naps.   You cannot have caffeine on the day of testing. If you normally take too much caffeine every day, consider get medical advice on how to taper off caffeine weeks prior testing to prevent having symptoms of caffeine withdrawal in the sleep laboratory.   During MSLT, there will be breakfast and lunch served.    You can also go to the following EDUCATION WEBSITES for further information:   American Academy of Sleep Medicine http://sleepeducation.org  National Sleep Foundation: https://sleepfoundation.org  American Sleep Apnea Association: https://www.sleepapnea.org (for patients with sleep apnea)  Narcolepsy Network: https://www.narcolepsynetwork.org (for patients with narcolepsy)  [employer]: https://www.wakeupnarcolepsy.org (for patients with narcolepsy)  Hypersomnia Foundation: https://www.hypersomniafoundation.org (for patients with idiopathic hypersomnia)  RLS foundation: https://www.rls.org (for patients with restless leg syndrome)    IMPORTANT INFORMATION     Call  [phone] for medical emergencies.  Our offices are generally open from Monday-Friday, 8 am - 5 pm.   There are no supporting services by either the sleep doctors or their staff on weekends and Holidays, or after 5 PM on weekdays.   If you need to get in touch with me, you may either call my office number or you can use [email or web address].  If a referral for a test, for CPAP, or for another specialist was made, and you have not heard about scheduling this within a week, please call scheduling at [phone]).  If you are unable to make your appointment for clinic or an overnight study, kindly call the office or sleep testing center at least 48 hours in advance to cancel and reschedule.  If you are on CPAP, please bring your device's card and/or the device to each clinic appointment.   In case of problems with PAP machine or mask interface, please contact your DME (Durable Medical Equipment) company first. DME is the company who provides you the machine and/or PAP supplies.       PRESCRIPTIONS     We require 7 days advanced notice for prescription refills. If we do not receive the request in this time, we cannot guarantee that your medication will be refilled in time.    IMPORTANT PHONE NUMBERS     Sleep Medicine Clinic Fax: [phone]  Appointments (for Pediatric Sleep Clinic): [phone] - option 1  Appointments (for Adult Sleep Clinic): [phone] - option 2  Appointments (For Sleep Studies): [phone] - option 3  Behavioral Sleep Medicine: [phone]  Sleep Surgery: [phone]  Nutrition Service: [phone]  Weight management clinics with endocrinology: [phone]  Bariatric Services: [phone] (includes weight loss medications and weight loss surgery)  [facility]: [phone] (offers holistic approaches to weight management)  ENT (Otolaryngology): [phone]  Headache Clinic (Neurology): [phone]  Neurology: [phone]  Psychiatry: [phone]  Pulmonary  Function Testing (PFT) Center: [phone]  Pulmonary Medicine: [phone]  Medical Service [employer] ([employer]): [phone]      OUR SLEEP TESTING LOCATIONS     Our team will contact you to schedule your sleep study, however, you can contact us as follow:  Main Phone Line (scheduling only): [phone]), option 3  Adult and Pediatric Locations  [facility] (6 years and older): [facility] - 4th floor ([address] [facility]) After hours line: [phone]  [facility] (Main campus: All ages): [facility], 6th floor. After hours line: [phone]   [place] (5 years and older; younger considered on case-by-case basis): [address]; [address], [address]. Scheduling  After hours line: [phone]   [place] (6 years and older): [address]; [address]; [address]   [place] (6 years and older): [address] [facility]  After hours line: [phone]   [employer] (13 years and older) in [place]: [address], 2nd floor  After hours line: [phone]  [facility] (13 year and older): [address]  After hours line: [phone]     Adult Only Locations:   [place] (18 years and older): [address] [place] [address], 2nd floor   [place] (18 years and older): [address] [facility]; 4th floor  After hours line: [phone]  [facility] (18 years and older) at [place]: [address] [facility]  After hours line: [phone]     CONTACTING YOUR SLEEP MEDICINE PROVIDER AND SLEEP TEAM      For issues with your machine or mask interface, please call your DME provider first. [employer] stands for durable medical company. DME is the company who provides you the machine and/or PAP supplies / accessories.   To schedule, cancel, or reschedule SLEEP STUDY APPOINTMENTS, please call the Main Phone Line at [phone] - option 3.   To schedule, cancel, or reschedule CLINIC APPOINTMENTS, you can do it in  "\"MyChart\", call [phone] (direct line) to speak with my practice lead (Martha Vines), or call the Main Phone Line at [phone] - option 2  For CLINICAL QUESTIONS or MEDICATION REFILLS, please call direct line for Adult Sleep Nurses at [phone].   Lastly, you can also send a message directly to your provider through \"My Chart\", which is the email service through your  Records Account: [email or web address]       Here at [facility], we wish you a restful sleep!    "

## 2024-06-13 DIAGNOSIS — Z01.84 IMMUNITY STATUS TESTING: Primary | ICD-10-CM

## 2024-06-27 ENCOUNTER — LAB (OUTPATIENT)
Dept: LAB | Facility: LAB | Age: 24
End: 2024-06-27
Payer: COMMERCIAL

## 2024-06-27 DIAGNOSIS — G47.10 EXCESSIVE SLEEPINESS: ICD-10-CM

## 2024-06-27 DIAGNOSIS — Z01.84 IMMUNITY STATUS TESTING: ICD-10-CM

## 2024-06-27 LAB — HBV SURFACE AB SER-ACNC: <3.1 MIU/ML

## 2024-06-27 PROCEDURE — 80307 DRUG TEST PRSMV CHEM ANLYZR: CPT

## 2024-06-27 PROCEDURE — 36415 COLL VENOUS BLD VENIPUNCTURE: CPT

## 2024-06-27 PROCEDURE — 86706 HEP B SURFACE ANTIBODY: CPT

## 2024-06-28 LAB
AMPHETAMINES UR QL SCN: NORMAL
BARBITURATES UR QL SCN: NORMAL
BENZODIAZ UR QL SCN: NORMAL
BZE UR QL SCN: NORMAL
CANNABINOIDS UR QL SCN: NORMAL
FENTANYL+NORFENTANYL UR QL SCN: NORMAL
METHADONE UR QL SCN: NORMAL
OPIATES UR QL SCN: NORMAL
OXYCODONE+OXYMORPHONE UR QL SCN: NORMAL
PCP UR QL SCN: NORMAL

## 2024-07-11 ENCOUNTER — CLINICAL SUPPORT (OUTPATIENT)
Dept: PRIMARY CARE | Facility: CLINIC | Age: 24
End: 2024-07-11
Payer: COMMERCIAL

## 2024-07-11 DIAGNOSIS — Z23 NEED FOR HEPATITIS B VACCINATION: Primary | ICD-10-CM

## 2024-07-11 PROCEDURE — 90471 IMMUNIZATION ADMIN: CPT | Performed by: FAMILY MEDICINE

## 2024-07-11 PROCEDURE — 90743 HEPB VACC 2 DOSE ADOLESC IM: CPT | Performed by: FAMILY MEDICINE

## 2024-07-17 ENCOUNTER — CLINICAL SUPPORT (OUTPATIENT)
Dept: SLEEP MEDICINE | Facility: CLINIC | Age: 24
End: 2024-07-17
Payer: COMMERCIAL

## 2024-07-17 DIAGNOSIS — G47.10 EXCESSIVE SLEEPINESS: ICD-10-CM

## 2024-07-18 ENCOUNTER — PROCEDURE VISIT (OUTPATIENT)
Dept: SLEEP MEDICINE | Facility: CLINIC | Age: 24
End: 2024-07-18
Payer: COMMERCIAL

## 2024-07-18 VITALS
HEIGHT: 62 IN | SYSTOLIC BLOOD PRESSURE: 107 MMHG | DIASTOLIC BLOOD PRESSURE: 74 MMHG | BODY MASS INDEX: 27.59 KG/M2 | WEIGHT: 149.91 LBS

## 2024-07-18 DIAGNOSIS — G47.10 EXCESSIVE SLEEPINESS: ICD-10-CM

## 2024-07-18 PROCEDURE — 80307 DRUG TEST PRSMV CHEM ANLYZR: CPT

## 2024-07-18 ASSESSMENT — PAIN SCALES - GENERAL: PAINLEVEL: 0-NO PAIN

## 2024-07-18 NOTE — PROGRESS NOTES
[facility] TECH NOTE:     Patient: Glenn Cox   MRN//AGE: [id number]  2000  23 y.o.   Technologist: Muriel Vaughn   Room: 439B   Service Date: 2024        Sleep Testing Location: [facility]:     TECHNOLOGIST SLEEP STUDY PROCEDURE NOTE:   This sleep study is being conducted according to the policies and procedures outlined by the AAS accreditation standards.  The sleep study procedure and processes involved during this appointment was explained to the patient/patient’s family, questions were answered. The patient/family verbalized understanding.      The patient is a 23 y.o. year old adult scheduled for aMSLT/MWT with montage of:  MSLT . Glenn arrived for Glenn's appointment.      The study that was ultimately completed was aMSLT/MWT with montage of:  MSLT .    The full study Was completed.  Patient questionnaires completed?: yes     Consents signed? yes    Initial Fall Risk Screening:     Glenn has not fallen in the last 6 months. Saeeds did not result in injury. Glenn does not have a fear of falling. He does not need assistance with sitting, standing, or walking. Glenn does not need assistance walking in Glenn's home. Glenn does not need assistance in an unfamiliar setting. The patient is notusing an assistive device.     Brief Study observations: PT fall asleep during the second nap only.     Deviation to order/protocol and reason: N/A      If PAP, which was preferred mask/pressure/mode: N/A      Other:None    After the procedure, the patient/family was informed to ensure followup with ordering clinician for testing results.      Technologist: Muriel Vaughn

## 2024-07-18 NOTE — PROGRESS NOTES
[facility] TECH NOTE:     Patient: Glenn Cox   MRN//AGE: [id number]  2000  23 y.o.   Technologist: DAYANNA AWAD   Room: 6   Service Date: 2024        Sleep Testing Location: [facility]    [place]: 10, 39    TECHNOLOGIST SLEEP STUDY PROCEDURE NOTE:   This sleep study is being conducted according to the policies and procedures outlined by the AAS accreditation standards.  The sleep study procedure and processes involved during this appointment was explained to the patient/patient’s family, questions were answered. The patient/family verbalized understanding.      The patient is a 23 y.o. year old adult scheduled for aDiagnostic PSG Split night with montage of: PSG  Glenn arrived for Glenn's appointment.      The study that was ultimately completed was aDiagnostic PSG Split night with montage of: PSG    The full study Was completed.  Patient questionnaires completed?: yes     Consents signed? yes    Initial Fall Risk Screening:     Glenn has not fallen in the last 6 months. Saeeds did not result in injury. Glenn does not have a fear of falling. He does not need assistance with sitting, standing, or walking. Glenn does not need assistance walking in Glenn's home. Glenn does not need assistance in an unfamiliar setting. The patient is notusing an assistive device.     Brief Study observations: Pt is here for MSLT    Deviation to order/protocol and reason: n/a     If PAP, which was preferred mask/pressure/mode: n/a     Other:None    After the procedure, the patient/family was informed to ensure followup with ordering clinician for testing results.      Technologist: DAYANNA AWAD

## 2024-07-19 ENCOUNTER — LAB REQUISITION (OUTPATIENT)
Dept: LAB | Facility: HOSPITAL | Age: 24
End: 2024-07-19
Payer: COMMERCIAL

## 2024-07-19 DIAGNOSIS — R40.0 SOMNOLENCE: ICD-10-CM

## 2024-07-30 ENCOUNTER — APPOINTMENT (OUTPATIENT)
Dept: PRIMARY CARE | Facility: CLINIC | Age: 24
End: 2024-07-30
Payer: COMMERCIAL

## 2024-07-30 VITALS
OXYGEN SATURATION: 97 % | BODY MASS INDEX: 27.49 KG/M2 | TEMPERATURE: 97.4 F | HEIGHT: 62 IN | DIASTOLIC BLOOD PRESSURE: 65 MMHG | HEART RATE: 97 BPM | WEIGHT: 149.4 LBS | SYSTOLIC BLOOD PRESSURE: 110 MMHG

## 2024-07-30 DIAGNOSIS — F32.A ANXIETY AND DEPRESSION: ICD-10-CM

## 2024-07-30 DIAGNOSIS — F41.9 ANXIETY AND DEPRESSION: ICD-10-CM

## 2024-07-30 DIAGNOSIS — E88.819 INSULIN RESISTANCE: ICD-10-CM

## 2024-07-30 DIAGNOSIS — K12.0 APHTHOUS ULCER: Primary | ICD-10-CM

## 2024-07-30 PROCEDURE — 3008F BODY MASS INDEX DOCD: CPT | Performed by: FAMILY MEDICINE

## 2024-07-30 PROCEDURE — 1036F TOBACCO NON-USER: CPT | Performed by: FAMILY MEDICINE

## 2024-07-30 PROCEDURE — 99214 OFFICE O/P EST MOD 30 MIN: CPT | Performed by: FAMILY MEDICINE

## 2024-07-30 ASSESSMENT — ENCOUNTER SYMPTOMS
LOSS OF SENSATION IN FEET: 0
DEPRESSION: 0
OCCASIONAL FEELINGS OF UNSTEADINESS: 0

## 2024-07-30 ASSESSMENT — PATIENT HEALTH QUESTIONNAIRE - PHQ9
1. LITTLE INTEREST OR PLEASURE IN DOING THINGS: NOT AT ALL
SUM OF ALL RESPONSES TO PHQ9 QUESTIONS 1 AND 2: 0
2. FEELING DOWN, DEPRESSED OR HOPELESS: NOT AT ALL

## 2024-07-30 ASSESSMENT — PAIN SCALES - GENERAL: PAINLEVEL: 0-NO PAIN

## 2024-07-30 NOTE — ASSESSMENT & PLAN NOTE
Discussed self limited and benign nature of condition  Continue to monitor  If not improving can prescribe mouth wash  Do recommend restarting B12 supplement and iron as well

## 2024-07-30 NOTE — ASSESSMENT & PLAN NOTE
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

## 2024-07-30 NOTE — PROGRESS NOTES
"Subjective   Patient ID: Glenn Cox is a 23 y.o. adult who presents for Results and Follow-up.    HPI   Has weaned off all of her psychiatric medications with help of psychiatrist.  Doing ok mental health wise but does have a headache which is slowly improving.    Struggling with following low carb diet, makes her thing about food too much.  Worried it may impact her mental health in the long run.      Has a sore in her mouth for last week or so.       Review of Systems  Negative unless noted in HPI    Objective   /65 (BP Location: Left arm, Patient Position: Sitting)   Pulse 97   Temp 36.3 °C (97.4 °F) (Temporal)   Ht 1.575 m (5' 2\")   Wt 67.8 kg (149 lb 6.4 oz)   SpO2 97%   BMI 27.33 kg/m²     Physical Exam  Constitutional:       Appearance: Normal appearance.   HENT:      Mouth/Throat:      Comments: Underneath the tongue there is a small lesion consistent with aphthous ulcer  Pulmonary:      Effort: Pulmonary effort is normal.   Neurological:      Mental Status: Glenn is alert.   Psychiatric:         Mood and Affect: Mood normal.         Assessment/Plan   Problem List Items Addressed This Visit             ICD-10-CM    Anxiety and depression F41.9, F32.A     Continue to monitor closely now that off of medications  Will recheck labs as well to see if meds had any effects, has CPE prior to leaving for grad school therefore can be done at that time         Insulin resistance E88.819     Discussed at length importance of balance, finding foods that nourish but not becoming hyper focused on diet   Recheck labs at CPE         Aphthous ulcer - Primary K12.0     Discussed self limited and benign nature of condition  Continue to monitor  If not improving can prescribe mouth wash  Do recommend restarting B12 supplement and iron as well               "

## 2024-07-30 NOTE — ASSESSMENT & PLAN NOTE
Discussed at length importance of balance, finding foods that nourish but not becoming hyper focused on diet   Recheck labs at [facility]

## 2024-08-20 ENCOUNTER — APPOINTMENT (OUTPATIENT)
Dept: PRIMARY CARE | Facility: CLINIC | Age: 24
End: 2024-08-20
Payer: COMMERCIAL

## 2024-08-20 VITALS
DIASTOLIC BLOOD PRESSURE: 60 MMHG | TEMPERATURE: 97.5 F | HEIGHT: 61 IN | OXYGEN SATURATION: 98 % | BODY MASS INDEX: 28.28 KG/M2 | SYSTOLIC BLOOD PRESSURE: 110 MMHG | WEIGHT: 149.8 LBS | HEART RATE: 104 BPM

## 2024-08-20 DIAGNOSIS — Z00.00 HEALTH MAINTENANCE EXAMINATION: Primary | ICD-10-CM

## 2024-08-20 DIAGNOSIS — Z30.019 ENCOUNTER FOR FEMALE BIRTH CONTROL: ICD-10-CM

## 2024-08-20 DIAGNOSIS — E88.819 INSULIN RESISTANCE: ICD-10-CM

## 2024-08-20 DIAGNOSIS — R79.89 ELEVATED PROLACTIN LEVEL: ICD-10-CM

## 2024-08-20 PROBLEM — K12.0 APHTHOUS ULCER: Status: RESOLVED | Noted: 2024-07-30 | Resolved: 2024-08-20

## 2024-08-20 LAB
ANION GAP SERPL CALC-SCNC: 14 MMOL/L (ref 10–20)
BUN SERPL-MCNC: 7 MG/DL (ref 6–23)
CALCIUM SERPL-MCNC: 9.5 MG/DL (ref 8.6–10.6)
CHLORIDE SERPL-SCNC: 106 MMOL/L (ref 98–107)
CHOLEST SERPL-MCNC: 193 MG/DL (ref 0–199)
CHOLESTEROL/HDL RATIO: 4.5
CO2 SERPL-SCNC: 23 MMOL/L (ref 21–32)
CREAT SERPL-MCNC: 0.7 MG/DL (ref 0.5–1.3)
EGFRCR SERPLBLD CKD-EPI 2021: >90 ML/MIN/1.73M*2
ERYTHROCYTE [DISTWIDTH] IN BLOOD BY AUTOMATED COUNT: 11.9 % (ref 11.5–14.5)
GLUCOSE SERPL-MCNC: 94 MG/DL (ref 74–99)
HCT VFR BLD AUTO: 38.1 % (ref 36–52)
HDLC SERPL-MCNC: 42.5 MG/DL
HGB BLD-MCNC: 12.3 G/DL (ref 12–17.5)
INSULIN SERPL-ACNC: 134 UIU/ML (ref 3–25)
LDLC SERPL CALC-MCNC: 113 MG/DL
MCH RBC QN AUTO: 28.6 PG (ref 26–34)
MCHC RBC AUTO-ENTMCNC: 32.3 G/DL (ref 32–36)
MCV RBC AUTO: 89 FL (ref 80–100)
NON HDL CHOLESTEROL: 151 MG/DL (ref 0–149)
NRBC BLD-RTO: 0 /100 WBCS (ref 0–0)
PLATELET # BLD AUTO: 344 X10*3/UL (ref 150–450)
POTASSIUM SERPL-SCNC: 4.2 MMOL/L (ref 3.5–5.3)
PROLACTIN SERPL-MCNC: 6.2 UG/L (ref 2–20)
RBC # BLD AUTO: 4.3 X10*6/UL (ref 4–5.9)
SODIUM SERPL-SCNC: 139 MMOL/L (ref 136–145)
TRIGL SERPL-MCNC: 186 MG/DL (ref 0–149)
VLDL: 37 MG/DL (ref 0–40)
WBC # BLD AUTO: 5.8 X10*3/UL (ref 4.4–11.3)

## 2024-08-20 PROCEDURE — 83525 ASSAY OF INSULIN: CPT

## 2024-08-20 PROCEDURE — 80048 BASIC METABOLIC PNL TOTAL CA: CPT

## 2024-08-20 PROCEDURE — 85027 COMPLETE CBC AUTOMATED: CPT

## 2024-08-20 PROCEDURE — 3008F BODY MASS INDEX DOCD: CPT | Performed by: FAMILY MEDICINE

## 2024-08-20 PROCEDURE — 80061 LIPID PANEL: CPT

## 2024-08-20 PROCEDURE — 84146 ASSAY OF PROLACTIN: CPT

## 2024-08-20 PROCEDURE — 1036F TOBACCO NON-USER: CPT | Performed by: FAMILY MEDICINE

## 2024-08-20 PROCEDURE — 99395 PREV VISIT EST AGE 18-39: CPT | Performed by: FAMILY MEDICINE

## 2024-08-20 RX ORDER — ERGOCALCIFEROL 1.25 MG/1
1 CAPSULE ORAL
COMMUNITY
Start: 2024-06-03

## 2024-08-20 RX ORDER — PROPRANOLOL HYDROCHLORIDE 80 MG/1
80 CAPSULE, EXTENDED RELEASE ORAL 3 TIMES DAILY PRN
COMMUNITY
End: 2024-08-20 | Stop reason: ALTCHOICE

## 2024-08-20 RX ORDER — ETONOGESTREL AND ETHINYL ESTRADIOL VAGINAL RING .015; .12 MG/D; MG/D
1 RING VAGINAL
Qty: 3 EACH | Refills: 3 | Status: SHIPPED | OUTPATIENT
Start: 2024-08-20 | End: 2025-08-20

## 2024-08-20 ASSESSMENT — ENCOUNTER SYMPTOMS
OCCASIONAL FEELINGS OF UNSTEADINESS: 0
DEPRESSION: 0
LOSS OF SENSATION IN FEET: 0

## 2024-08-20 ASSESSMENT — PAIN SCALES - GENERAL: PAINLEVEL: 0-NO PAIN

## 2024-08-20 NOTE — ASSESSMENT & PLAN NOTE
Recommended screening guidelines addressed and orders placed as indicated by age and chronic conditions  We discussed PAP testing, indications and considerations  Screening labs ordered, will call with results  Continue to work on healthy lifestyle including well balanced diet, regular activity, limit alcohol, no tobacco products   Follow up annually

## 2024-08-20 NOTE — PROGRESS NOTES
Reason for Visit: Annual Physical Exam    HPI:  Presents for wellness visit  Doing well overall, has been totally weaned off of most meds since second week in July and doing well overall  Denies any significant symptoms currently  Moving to [place] to start grad school next week, excited and nervous    Active Problem List  Patient Active Problem List   Diagnosis    Anxiety and depression    STEPHANIE (generalized anxiety disorder)    Fibromyalgia    Pelvic floor dysfunction    Health maintenance examination    Other fatigue    Amenorrhea    Encounter for female birth control    Chronic bilateral thoracic back pain    Gastrocnemius equinus    Gender dysphoria    Myalgia    Gynecomastia, female    Myopia    Peripheral neuritis of right foot    Flatfoot    Pes valgus    Posterior tibial tendinitis of right leg    Pronation deformity of foot    Sinus tarsi syndrome of right ankle    Back pain with radiculopathy    Astigmatism, bilateral    Gastroesophageal reflux disease    PCOS (polycystic ovarian syndrome)    Excessive sleepiness    Keratosis pilaris    Insulin resistance    Elevated prolactin level       Comprehensive Medical/Surgical/Social/Family History  Past Medical History:   Diagnosis Date    Abdominal pain 06/06/2023    Abnormal uterine bleeding (AUB) 10/17/2023    Aphthous ulcer 07/30/2024    Flat foot (pes planus) (acquired), right foot     Flat feet    Health maintenance examination 06/06/2023    Presence of spectacles and contact lenses     Wears glasses     Past Surgical History:   Procedure Laterality Date    OTHER SURGICAL HISTORY  03/28/2021    Ankle surgery     Social History     Tobacco Use    Smoking status: Never     Passive exposure: Never    Smokeless tobacco: Never   Vaping Use    Vaping status: Some Days    Substances: THC, CBD   Substance Use Topics    Alcohol use: Not Currently     Comment: rarely    Drug use: Yes     Types: Marijuana     Comment: social     Family History   Problem Relation Name  "Age of Onset    No Known Problems Mother      No Known Problems Father      Hypertension Other           Allergies and Medications  Penicillin, Penicillins, Mineral oil-pegs (bulk), and Adhesive  Current Outpatient Medications on File Prior to Visit   Medication Sig Dispense Refill    cyanocobalamin (Vitamin B-12) 1,000 mcg tablet Take 100 mcg by mouth once daily.      ergocalciferol (Vitamin D-2) 1.25 MG (20579 UT) capsule Take 1 capsule (1,250 mcg) by mouth every 7 days.      IRON, FERROUS SULFATE, ORAL Take 25 mg by mouth.      [DISCONTINUED] etonogestreL-ethinyl estradioL (Nuvaring) 0.12-0.015 mg/24 hr vaginal ring Insert 1 each into the vagina every 28 (twenty-eight) days. leave in place for 3 consecutive weeks and remove for 1 week (7 days) 3 each 3    [DISCONTINUED] propranolol LA (Inderal LA) 80 mg 24 hr capsule Take 1 capsule (80 mg) by mouth 3 times a day as needed. Do not crush, chew, or split.       No current facility-administered medications on file prior to visit.         ROS otherwise negative aside from what was mentioned above in HPI.    Vitals  /60 (BP Location: Left arm, Patient Position: Sitting, BP Cuff Size: Small adult)   Pulse 104   Temp 36.4 °C (97.5 °F) (Temporal)   Ht 1.549 m (5' 1\")   Wt 67.9 kg (149 lb 12.8 oz)   SpO2 98%   BMI 28.30 kg/m²   Body mass index is 28.3 kg/m².  Physical Exam  Gen: Alert, NAD  HEENT:  PERRL, EOMI, conjunctiva and sclera normal in appearance. External auditory canals/TMs normal; Oral cavity and posterior pharynx without lesions/exudate  Neck:  Supple with FROM; No masses/nodes palpable; Thyroid nontender and without nodules; No YOJANA  Respiratory:  Lungs CTAB  Cardiovascular:  Heart RRR. No M/R/G. Peripheral pulses equal bilaterally  Abdomen:  Soft, nontender, No R/G/R; No HSM or masses palpated  Extremities:  FROM all extremities; Muscle strength grossly normal with good tone  Neuro:  CN II-XII intact;  Gross motor and sensory intact  Skin:  No " suspicious lesions present    Assessment and Plan:  Problem List Items Addressed This Visit       Health maintenance examination - Primary    Current Assessment & Plan     Recommended screening guidelines addressed and orders placed as indicated by age and chronic conditions  We discussed PAP testing, indications and considerations  Screening labs ordered, will call with results  Continue to work on healthy lifestyle including well balanced diet, regular activity, limit alcohol, no tobacco products   Follow up annually           Relevant Orders    Basic Metabolic Panel    Lipid Panel    CBC    Encounter for female birth control    Relevant Medications    etonogestreL-ethinyl estradioL (Nuvaring) 0.12-0.015 mg/24 hr vaginal ring    Insulin resistance    Relevant Orders    Insulin, random    Elevated prolactin level    Overview     Secondary to long term Perphenazine use         Current Assessment & Plan     Recheck levels now that off of Perphenazine         Relevant Orders    Prolactin level         Marilu Carvalho MD

## 2024-08-21 ENCOUNTER — OFFICE VISIT (OUTPATIENT)
Dept: SLEEP MEDICINE | Facility: CLINIC | Age: 24
End: 2024-08-21
Payer: COMMERCIAL

## 2024-08-21 VITALS
HEART RATE: 92 BPM | OXYGEN SATURATION: 97 % | BODY MASS INDEX: 28.13 KG/M2 | WEIGHT: 149 LBS | RESPIRATION RATE: 16 BRPM | SYSTOLIC BLOOD PRESSURE: 106 MMHG | DIASTOLIC BLOOD PRESSURE: 71 MMHG | HEIGHT: 61 IN

## 2024-08-21 DIAGNOSIS — G47.10 EXCESSIVE SLEEPINESS: ICD-10-CM

## 2024-08-21 PROCEDURE — 99213 OFFICE O/P EST LOW 20 MIN: CPT | Performed by: INTERNAL MEDICINE

## 2024-08-21 PROCEDURE — 1036F TOBACCO NON-USER: CPT | Performed by: INTERNAL MEDICINE

## 2024-08-21 PROCEDURE — 3008F BODY MASS INDEX DOCD: CPT | Performed by: INTERNAL MEDICINE

## 2024-08-21 ASSESSMENT — SLEEP AND FATIGUE QUESTIONNAIRES
HOW LIKELY ARE YOU TO NOD OFF OR FALL ASLEEP IN A CAR, WHILE STOPPED FOR A FEW MINUTES IN TRAFFIC: WOULD NEVER DOZE
HOW LIKELY ARE YOU TO NOD OFF OR FALL ASLEEP WHILE SITTING QUIETLY AFTER LUNCH WITHOUT ALCOHOL: WOULD NEVER DOZE
HOW LIKELY ARE YOU TO NOD OFF OR FALL ASLEEP WHEN YOU ARE A PASSENGER IN A CAR FOR AN HOUR WITHOUT A BREAK: HIGH CHANCE OF DOZING
HOW LIKELY ARE YOU TO NOD OFF OR FALL ASLEEP WHILE LYING DOWN TO REST IN THE AFTERNOON WHEN CIRCUMSTANCES PERMIT: HIGH CHANCE OF DOZING
SITING INACTIVE IN A PUBLIC PLACE LIKE A CLASS ROOM OR A MOVIE THEATER: WOULD NEVER DOZE
HOW LIKELY ARE YOU TO NOD OFF OR FALL ASLEEP WHILE SITTING AND READING: WOULD NEVER DOZE
HOW LIKELY ARE YOU TO NOD OFF OR FALL ASLEEP WHILE WATCHING TV: WOULD NEVER DOZE
HOW LIKELY ARE YOU TO NOD OFF OR FALL ASLEEP WHILE SITTING AND TALKING TO SOMEONE: WOULD NEVER DOZE
ESS-CHAD TOTAL SCORE: 6

## 2024-08-21 ASSESSMENT — PAIN SCALES - GENERAL: PAINLEVEL: 0-NO PAIN

## 2024-08-21 NOTE — PROGRESS NOTES
[facility]  FOLLOW-UP VISIT NOTE    PCP: Marilu Carvalho MD    HISTORY OF PRESENT ILLNESS     Patient ID: Glenn Cox is a 23 y.o. adult who presents for follow-up after sleep study    Patient is here alone today.  To review, patient's medical history is notable for seasonal allergies, GERD, PCOS, insulin resistance, anxiety, depression, and fibromyalgia.      Interval History  Patient was last seen in 5/24/2024. Plan was to  do PSG-MSLT .  Since last visit, patient had completed sleep study which showed mild JOSH by AASM criteria and no JOSH by CMS criteria. Also, MSLT showed no narcolepsy nor objective pathologic sleepiness.  Of note, patient states that she had problems falling asleep ever since she was a child. Back in high school, she goes to bed at 11 PM daily, falls asleep at 12 MN to 1 AM, and wakes up 6:30 AM on weekdays and 12 NN on weekends. She thinks she might be long sleeper.    RLS Follow-up:   Denies    SLEEP STUDY HISTORY (personally reviewed raw data such as interpretation report, data sheet, hypnogram, and titration table if available and applicable)  PSG 7/17/2024: RDI3% 6 (supine RDI3% 6.1, non-supine RDI3% 3.8), RDI4% 2.7 (supine RDI4% 2.8, non-supine RDI4% 0), SpO2 ruben 93%, sleep latency 182.5 minutes, sleep efficiency 52.7%, REM latency 87.5 minutes, PLM index 0  MSLT 7/18/2024: MSL 18.4 minutes, no SOREMP. UDS negative, no available sleep log for review. Patient was off-Bupropion and other meds 2 weeks prior the study.    SLEEP-WAKE SCHEDULE  Bedtime:  11 PM to 12 MN daily  Subjective sleep latency: 3-4 hours (falls asleep about 2-3 AM)  Difficulty falling asleep: yes due to mind-racing / rumination / worry and electronic devices (cellphone)  Number of awakenings: once per night to go to bathroom (especially after stopping Bupropion)  Final wake time:  1 PM to 1:30 PM daily (wakes up at 10 AM then falls back asleep)  Out of bed time:  2 PM to 2:30 PM daily.  "  Shift work: No   Naps: no  Average sleep duration (excluding naps): 10-12 hours    SLEEP ENVIRONMENT  Sleep location: bed  Sleep status: sleeps alone  Preferred sleep position: back and side    SOCIAL HISTORY  Smoking: no  ETOH: no  Marijuana: yes recreational marijuana once a month   Caffeine: 1 cup coffee daily on work days  Sleep aids: no    WEIGHT: stable    Claustrophobia: No     REVIEW OF SYSTEMS     All other systems have been reviewed and are negative.    ALLERGIES     Allergies   Allergen Reactions    Penicillin Hives    Penicillins Hives    Mineral Oil-Pegs (Bulk) Other    Adhesive Rash       MEDICATIONS     Current Outpatient Medications   Medication Sig Dispense Refill    cyanocobalamin (Vitamin B-12) 1,000 mcg tablet Take 100 mcg by mouth once daily.      ergocalciferol (Vitamin D-2) 1.25 MG (75312 UT) capsule Take 1 capsule (1,250 mcg) by mouth every 7 days.      etonogestreL-ethinyl estradioL (Nuvaring) 0.12-0.015 mg/24 hr vaginal ring Insert 1 each into the vagina every 28 (twenty-eight) days. leave in place for 3 consecutive weeks and remove for 1 week (7 days) 3 each 3    IRON, FERROUS SULFATE, ORAL Take 25 mg by mouth.       No current facility-administered medications for this visit.       Active Problems, Allergy List, Medication List, and PMH/PSH/FH/Social Hx have been reviewed and reconciled in chart. No significant changes unless documented in the pertinent chart section. Updates made when necessary.       PHYSICAL EXAM     VITAL SIGNS: /71   Pulse 92   Resp 16   Ht 1.549 m (5' 1\")   Wt 67.6 kg (149 lb)   SpO2 97%   BMI 28.15 kg/m²     NECK CIRCUMFERENCE: 14.25 inches    Today ESS: 6  Last visit ESS: 10  HILDA: 17    Physical Exam  Constitutional: Awake, not in distress  Head: Normocephalic, atraumatic  Skin: Warm, no rash  Neuro: No tremors, moves all extremities  Psych: alert and oriented to time, place, and person    RESULTS/DATA     Iron (ug/dL)   Date Value   06/07/2023 37 "     Iron Saturation (%)   Date Value   06/07/2023 10 (L)     TIBC (ug/dL)   Date Value   06/07/2023 388       Bicarbonate   Date Value Ref Range Status   08/20/2024 23 21 - 32 mmol/L Final       ASSESSMENT/PLAN     Glenn Cox is a 23 y.o. adult who returns in [facility] for the following problems:    OBSTRUCTIVE SLEEP APNEA, MILD positional (PSG RDI3% 6, RDI4% 2.7)  - Personally reviewed available sleep study's raw data such as interpretation report, data sheet, and hypnogram. Discussed results with patient today. All questions answered. A copy of recent testing was either given to patient or released in [facility]. See HPI for detailed summary of sleep study results.   - Due to patient's insurance following the CMS criteria, patient's RDI4% did not reach the threshold to treat. However, based on AASM definition, patient has JOSH. Discussed with patient that this issue might be related to the fact that patient did not sleep well in the sleep lab and had TST<6 hours.   - Also, discussed with patient that there are 2 criteria currently being followed to diagnose sleep apnea in a sleep study: AASM recommended criteria and CMS criteria. Discussed with patient that AASM recommended criteria includes all apneas and hypopneas associated with either 3% O2 desaturation or EEG arousals. However, CMS criteria only includes all apneas and hypopneas associated with at least 4% O2 desaturation. Unfortunately, CMS does not recognize arousal-based hypopneas which commonly occur in younger adult population or females in general; hence, CMS criteria always underestimate JOSH in young patients.  As a result, since RDI4% (aka CMS criteria) did not reach 5 events per hour, I won't be able to order titration PSG or any kind of treatment for JOSH even if JOSH is indeed present based on RDI3% (AASM recommended criteria).  - Consider repeating sleep study using HSAT on all-supine sleep to reevaluate JOSH.  However, patient states that she is moving to [facility] next week for masters' degree in school psychology. Discussed with patient to see a sleep doctor in [place] and see if her health insurance there will approve CPAP using AASM criteria    SEASONAL ALLERGIES  - Start fluticasone nasal spray 2 sprays per nostril once daily 1 hour before bedtime.  - If there is inadequate or lack of improvement on the above intranasal steroid spray, consider adding Azelastine nasal spray, 2 sprays per nostril once daily in the morning.   - Alternatively, may add cetirizine or loratadine 10 mg once daily.    EXCESSIVE DAYTIME SLEEPINESS and FATIGUE  - Based on MSLT, patient did not have objective pathologic sleepiness disorder such as narcolepsy or IH. Also, based on PSG that preceded MSLT, her sleep onset was 3 hours. Hence, patient's excessive daytime sleepiness is likely multifactorial and due to untreated JOSH, chronic sleep onset and sleep maintenance insomnia, circadian rhythm dysregulation (aka social jet lag), and mood disorders which started since she was in high school.     CHRONIC INSOMNIA, SLEEP-ONSET and SLEEP-MAINTENANCE  - Sleep-onset insomnia is likely due to poor sleep habits (rumination, electronic use at bedtime, spending too much time in bed), RLS, and mood disorders (anxiety, depression)  - Sleep maintenance insomnia is likely due to JOSH, poor sleep habits, and mood disorders (anxiety, depression)  - Discussed treatment options such as cognitive and behavioral therapy for insomnia (CBT-i) and pharmacotherapy. In addition, we discussed having realistic expectations and goals for therapy.    - The behavioral portion of CBT-i was also discussed in detail, including stimulus control. The goal of stimulus control is to retrain the patient to associate bedtime and the bed/bedroom with successful sleep. Stimulus control techniques involve going to bed only when sleepy and not fatigued, restricting all non-sleep and  "non-intimacy activities from bed, getting out of bed if unable to fall asleep within roughly 15 minutes, and avoid napping.   - Tips for good sleep habits: Maintain a consistent awakening time (fixed wake-up time) even on weekends regardless of bedtime or sleep quality. Avoid clock-watching, worrying, and planning if awake in bed. Allot a separate time called \"worry time\" to write down worries and plans. In order to promote sleep, consider passive body heating (hot shower or bath for 15-20 minutes) 2 hours before bedtime and/or create a \"buffer zone\" or \"wind-down time\" 30 minutes to 1-2 hours before going to bed. Avoid blue light exposure at bedtime or when awake in the middle of the night. Avoid smoking, large meal, exercise, alcohol, and technology 2-3 hours before bedtime. Limit amount of caffeine intake and avoid caffeine past 12 noon. Limit fluid intake at 8 oz during dinner then restrict fluids after dinner except sips of water with medication intake, and void at bedtime. Keep the bedroom dark, quiet, and cool. May use and turn on white noise machine, humidifier, or fan while asleep.  - Sleep diary given to patient to monitor sleep patterns. Consider sleep restriction therapy if patient is not having 85% sleep efficiency.   - Refer to sleep psychology for formal CBT-i.    - Patient agreed with the plan and verbalized understanding.        Follow-up as needed for now      All of patient's questions were answered. Glenn verbalizes understanding and agreement with my assessment and plan. Refer to after-visit-summary (AVS) for patient education and if applicable, for more details on troubleshooting issues with PAP usage, proper cleaning instructions of PAP supplies, and usual recommended replacement schedule for each of the PAP supplies.       "

## 2024-12-13 DIAGNOSIS — R11.0 NAUSEA: Primary | ICD-10-CM

## 2024-12-13 DIAGNOSIS — Z30.019 ENCOUNTER FOR FEMALE BIRTH CONTROL: ICD-10-CM

## 2024-12-13 RX ORDER — ETONOGESTREL AND ETHINYL ESTRADIOL VAGINAL RING .015; .12 MG/D; MG/D
1 RING VAGINAL
Qty: 3 EACH | Refills: 3 | Status: SHIPPED | OUTPATIENT
Start: 2024-12-13 | End: 2025-12-13

## 2024-12-13 RX ORDER — ONDANSETRON 4 MG/1
4 TABLET, ORALLY DISINTEGRATING ORAL EVERY 8 HOURS PRN
Qty: 20 TABLET | Refills: 0 | Status: SHIPPED | OUTPATIENT
Start: 2024-12-13 | End: 2024-12-20